# Patient Record
Sex: FEMALE | Race: WHITE | HISPANIC OR LATINO | Employment: UNEMPLOYED | URBAN - METROPOLITAN AREA
[De-identification: names, ages, dates, MRNs, and addresses within clinical notes are randomized per-mention and may not be internally consistent; named-entity substitution may affect disease eponyms.]

---
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
## 2020-09-01 ENCOUNTER — PHYSICAL THERAPY (OUTPATIENT)
Dept: PHYSICAL THERAPY | Facility: REHABILITATION | Age: 20
End: 2020-09-01
Attending: STUDENT IN AN ORGANIZED HEALTH CARE EDUCATION/TRAINING PROGRAM
Payer: COMMERCIAL

## 2020-09-01 DIAGNOSIS — G83.10 MONOPLEGIA OF LOWER EXTREMITY, UNSPECIFIED ETIOLOGY, UNSPECIFIED LATERALITY (HCC): ICD-10-CM

## 2020-09-01 PROCEDURE — 97163 PT EVAL HIGH COMPLEX 45 MIN: CPT

## 2020-09-01 ASSESSMENT — ENCOUNTER SYMPTOMS
PAIN SCALE: 0
PAIN TIMING: OCCASIONAL
PAIN SCALE AT LOWEST: 0

## 2020-09-01 ASSESSMENT — ACTIVITIES OF DAILY LIVING (ADL)
POOR_BALANCE: 1
AMBULATION_WITH_ASSISTIVE_DEVICE: INDEPENDENT
AMBULATION_WITHOUT_ASSISTIVE_DEVICE: SUPERVISION
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
## 2020-09-02 ENCOUNTER — TELEPHONE (OUTPATIENT)
Dept: PHYSICAL THERAPY | Facility: REHABILITATION | Age: 20
End: 2020-09-02

## 2020-09-15 ENCOUNTER — PHYSICAL THERAPY (OUTPATIENT)
Dept: PHYSICAL THERAPY | Facility: REHABILITATION | Age: 20
End: 2020-09-15
Attending: STUDENT IN AN ORGANIZED HEALTH CARE EDUCATION/TRAINING PROGRAM
Payer: COMMERCIAL

## 2020-09-15 DIAGNOSIS — M62.81 MUSCLE WEAKNESS (GENERALIZED): ICD-10-CM

## 2020-09-15 DIAGNOSIS — R26.9 UNSPECIFIED ABNORMALITIES OF GAIT AND MOBILITY: ICD-10-CM

## 2020-09-15 DIAGNOSIS — G83.10 MONOPLEGIA OF LOWER EXTREMITY, UNSPECIFIED ETIOLOGY, UNSPECIFIED LATERALITY (HCC): ICD-10-CM

## 2020-09-15 PROCEDURE — 97014 ELECTRIC STIMULATION THERAPY: CPT

## 2020-09-15 PROCEDURE — 97110 THERAPEUTIC EXERCISES: CPT

## 2020-09-15 PROCEDURE — 97112 NEUROMUSCULAR REEDUCATION: CPT

## 2020-09-15 NOTE — OP THERAPY DAILY TREATMENT
"  Outpatient Physical Therapy  DAILY TREATMENT     [facility] Physical [address]  [address]  [place] [address]  Phone:  [phone]  Fax:  [phone]    Date: 09/15/2020    Patient: Vanessa Cobos  YOB: 2000  MRN: [id number]     Time Calculation    Start time: 1430  Stop time: 1530 Time Calculation (min): 60 minutes         Chief Complaint: Difficulty Walking    Visit #: 2    SUBJECTIVE:  Pt reports she walks with walker without KAFO short distances at home.  LLE feels numb.  Wears KAFO at work, standing for 8 hour shift.    OBJECTIVE:        Therapeutic Exercises (CPT 04691):     1. Bridges, x10    2. STS, x10, requires cues to keep L foot equal with R rather than out in front of R foot.    4. Seated clams with med-light band, x10    5. Standing hip abd, x10L with BUEs    6. Standing hip flex/ext with foot slider, x10L with BUEs    7. Seated hamstring curl, unable    Therapeutic Treatments and Modalities:     1. Neuromuscular Re-education (CPT 59304)    2. E Stim Unattended (CPT 76656), Russian stim, 10sec on/10sec off, to L VMO., Pt demo intermittent twitching of LLE during EStim, but appeared to be involutary reaction to the sensation, not due to quad contraction.    Therapeutic Treatment and Modalities Summary: -AP weight shift in stride stance x10B in //bars with KAFO.  Manual facil at L knee for extension in stance.  -AP weight shift in stride stance x10 L foot forward, at FWW without KAFO.    Manual facil at L knee for extension in stance.  -Step tap to 2\" step x10L with BUEs.  -Supine LLE PNF with assist  -Supine L heel slides with assist x10  -Hooklying isometric L hip ER into manual resistance x10  -L quad sets into manual resistance x10        Time-based treatments/modalities:    Physical Therapy Timed Treatment Charges  Neuromusc re-ed, balance, coor, post minutes (CPT 57717): 30 minutes  Therapeutic exercise minutes (CPT 15047): 15 " minutes    ASSESSMENT:   Response to treatment: Pt demo poor motor control and functional use of LLE.    PLAN/RECOMMENDATIONS:   Plan for treatment: therapy treatment to continue next visit.  Planned interventions for next visit: continue with current treatment.  Standing, weight bearing, pre-gait work at //bars without KAFO.

## 2020-09-18 ENCOUNTER — PHYSICAL THERAPY (OUTPATIENT)
Dept: PHYSICAL THERAPY | Facility: REHABILITATION | Age: 20
End: 2020-09-18
Attending: STUDENT IN AN ORGANIZED HEALTH CARE EDUCATION/TRAINING PROGRAM
Payer: COMMERCIAL

## 2020-09-18 DIAGNOSIS — R26.9 UNSPECIFIED ABNORMALITIES OF GAIT AND MOBILITY: ICD-10-CM

## 2020-09-18 DIAGNOSIS — G83.10 MONOPLEGIA OF LOWER EXTREMITY, UNSPECIFIED ETIOLOGY, UNSPECIFIED LATERALITY (HCC): ICD-10-CM

## 2020-09-18 PROCEDURE — 97112 NEUROMUSCULAR REEDUCATION: CPT

## 2020-09-18 PROCEDURE — 97110 THERAPEUTIC EXERCISES: CPT

## 2020-09-18 NOTE — OP THERAPY DAILY TREATMENT
Outpatient Physical Therapy  DAILY TREATMENT     [facility] Physical [address]  [address]  [place] [address]  Phone:  [phone]  Fax:  [phone]    Date: 09/18/2020    Patient: Vanessa Cobos  YOB: 2000  MRN: [id number]     Time Calculation    Start time: 1500  Stop time: 1555 Time Calculation (min): 55 minutes         Chief Complaint: Difficulty Walking and Other (weakness)    Visit #: 3    SUBJECTIVE:  Felt ok after last PT session.  States legs are tired currently from standing all shift at work.    OBJECTIVE:        Therapeutic Exercises (CPT 45176):     1. Quad sets, L x10, minimal prox VM / adductor muscle activation visible    2. Hooklying hip abd/add, L, x10 with manual resistance to abd, AROM with tactile cues for add    3. Supine leg press, L, x10, AROM with Daija for hip/knee flex, maxA for hip/knee ext    4. Seated leg press into dynadisc, L, x10    5. NuStep, level 3, x5 min, emphasis on quad activation with L knee ext and not allowing leg to fall into ER in hip flexion    Therapeutic Treatments and Modalities:     1. Neuromuscular Re-education (CPT 47568)    Therapeutic Treatment and Modalities Summary: Standing without KAFO and BUEs at //bars with mirror for visual feedback.  Focus on midline orientation, getting hips and shoulders centered over feet, required visual and tactile cues.  Manual facilitation for LLE weight acceptance and quad and glute engagement for hip/knee extension.  With cueing, and Daija/CGA, pt able to decreased UE support at //bars in standing and maintaining L LE extension and midline orientation.  Able to lift both hands from //bars for only 1-2 seconds.  Lateral weight shift in standing with BUEs at //bars.  Initially with Daija to get adequate weight shift over LLE.  Then only with CGA.  Then with single UE support.  Observed less L knee ext as leg fatigued and with decreased UE support.  R step through at //bars with  manual facilitation at L quad and glute for L stance.  Pt initially relies heavily on UEs to lift body over LLE to allow for R swing.  Pt given instruction to lift less with UEs.  Pt able to minimize UE support, but still limited LLE weight acceptance.  Standing L hip flex with foot on floor slider, x10 with BUE support.  Standing L hip abd with foot on floor slider, x10 with BUE support.  STS from EOM with FWW in front of pt if needed.  Verbal and tactile cues to increased LLE weight bearing in STS.  Tactile cues at L knee and hip. Verbal cues to lean nose over L knee.  Pt given visual cue to look at to her left to avoid R lean.  Pt naturally places R foot back for STS, so PT foot placed behind pt's R foot so that she could not favor RLE.  Gait with KAFO and single Lofstrand crutch.  Pt demo fair L hip flexion in swing phase, L Trendelenburg, inadequate L hip extension in stance, L hip hike.      Time-based treatments/modalities:    Physical Therapy Timed Treatment Charges  Neuromusc re-ed, balance, coor, post minutes (CPT 99403): 30 minutes  Therapeutic exercise minutes (CPT 27180): 25 minutes    ASSESSMENT:   Response to treatment: Pt able to perform standing midline with decreased UE support with visual feedback and minimal tactile cues.    PLAN/RECOMMENDATIONS:   Plan for treatment: therapy treatment to continue next visit.  Planned interventions for next visit: continue with current treatment.

## 2020-09-22 ENCOUNTER — PHYSICAL THERAPY (OUTPATIENT)
Dept: PHYSICAL THERAPY | Facility: REHABILITATION | Age: 20
End: 2020-09-22
Attending: STUDENT IN AN ORGANIZED HEALTH CARE EDUCATION/TRAINING PROGRAM
Payer: COMMERCIAL

## 2020-09-22 DIAGNOSIS — G83.10 MONOPLEGIA OF LOWER EXTREMITY, UNSPECIFIED ETIOLOGY, UNSPECIFIED LATERALITY (HCC): ICD-10-CM

## 2020-09-22 DIAGNOSIS — R26.9 UNSPECIFIED ABNORMALITIES OF GAIT AND MOBILITY: ICD-10-CM

## 2020-09-22 DIAGNOSIS — M62.81 MUSCLE WEAKNESS (GENERALIZED): ICD-10-CM

## 2020-09-22 PROCEDURE — 97110 THERAPEUTIC EXERCISES: CPT

## 2020-09-22 PROCEDURE — 97112 NEUROMUSCULAR REEDUCATION: CPT

## 2020-09-22 NOTE — OP THERAPY DAILY TREATMENT
Outpatient Physical Therapy  DAILY TREATMENT     [facility] Physical [address]  [address]  [place] [address]  Phone:  [phone]  Fax:  [phone]    Date: 09/22/2020    Patient: Vanessa Cobos  YOB: 2000  MRN: [id number]     Time Calculation    Start time: 1456  Stop time: 1557 Time Calculation (min): 61 minutes         Chief Complaint: Difficulty Walking and Other (weakness)    Visit #: 4    SUBJECTIVE:  Feeling ok.    OBJECTIVE:        Therapeutic Exercises (CPT 70869):     1. Supine LLE ball roll HS curl, x10, Daija- palpable HS contraction    2. Supine hip abd/add slide, x10L    3. Bridges, x10, x10 with RLE over ball, PT CGA at L foot for weight acceptance    5. NuStep, level 3, x5 min    6. Standing hip rotation on Fitter spinner board, x15L    7. Standing hip abd/add, x10L , compensates with hip hike/trunk lean    Therapeutic Treatments and Modalities:     1. Neuromuscular Re-education (CPT 69230), all performed without KAFO except where noted.    Therapeutic Treatment and Modalities Summary: Seated leg press into ball, x15L with CGA at foot for placement.  Seated ball roll knee ext/flex with min A, x15L with manual facilitation for quad and HS recruitment.  STS to FWW x10 with manual facil at L glute and quad and cues for midline orientation.   Pt naturally places R foot back for STS, so PT foot placed behind pt's R foot so that she could not favor RLE.  STS with Airex under L foot to FWW, x8, with manual facilitation and cues for increased LLE weight acceptance.  Squats at walker - tactile cues for midline, x10  Standing without KAFO and BUEs at //bars with mirror for visual feedback.  Focus on midline orientation, getting hips and shoulders centered over feet, required visual and tactile cues.  Manual facilitation for LLE weight acceptance and quad and glute engagement for hip/knee extension.  With cueing, and Daija/CGA, pt able to decreased UE support  at //bars in standing and maintaining L LE extension and midline orientation.  Able to lift both hands from //bars for 3-4 seconds.  Lateral weight shift in standing with BUEs at //bars.  Initially with Daija to get adequate weight shift over LLE.  Then only with CGA.  Required continued cues for L hip ext with L weight shift.  AP weight shift in stride stance with BUE s at //bars, L foot forward.  Tactile cues and Daija for LLE weight acceptance and avoiding L knee hyperextension.  Poor forward weight translation into LLE, pt demo L pelvic retraction.  Given visual and tactile cues, pt able to improve pelvic translation and hip ext, still requiring manual facil and verbal cues.  Gait in //bars with ACE wrap to L ankle to limit inversion in weight bearing.  Cues for improved weight acceptance in L stance and avoiding knee hyperextension.  Step tap to 1/2 FR at //bars, alt R/L x10.  Pt instructed to slow down for L stance prior to lifting R foot.  Pt requires tactile cues and Daija to avoid L knee hyperextension in stance    Gait with KAFO and single Lofstrand crutch.  Pt given instruction to carry over pre-gait activities we had done in //bars (weight shift onto LLE in stance phase, with L hip in ext and L knee extended but not hyperextended, then allow for R heel off, complete weight shift to LLE, then R step through).  Pt demo L pelvic retraction and lack of hip extension and translation of COM over LLE.  Made pt slow down, and provided manual facilitation and tactile cues for improved L stance, with some improvement in gait.    Time-based treatments/modalities:    Physical Therapy Timed Treatment Charges  Neuromusc re-ed, balance, coor, post minutes (CPT 84372): 45 minutes  Therapeutic exercise minutes (CPT 12018): 15 minutes    ASSESSMENT:   Response to treatment: Pt demo improving una for interventions and improving equal LE weight bearing in standing.    PLAN/RECOMMENDATIONS:   Plan for treatment: therapy  treatment to continue next visit.  Planned interventions for next visit: continue with current treatment.

## 2020-09-25 ENCOUNTER — PHYSICAL THERAPY (OUTPATIENT)
Dept: PHYSICAL THERAPY | Facility: REHABILITATION | Age: 20
End: 2020-09-25
Attending: STUDENT IN AN ORGANIZED HEALTH CARE EDUCATION/TRAINING PROGRAM
Payer: COMMERCIAL

## 2020-09-25 DIAGNOSIS — M62.81 MUSCLE WEAKNESS (GENERALIZED): ICD-10-CM

## 2020-09-25 DIAGNOSIS — R26.9 UNSPECIFIED ABNORMALITIES OF GAIT AND MOBILITY: ICD-10-CM

## 2020-09-25 DIAGNOSIS — G83.10 MONOPLEGIA OF LOWER EXTREMITY, UNSPECIFIED ETIOLOGY, UNSPECIFIED LATERALITY (HCC): ICD-10-CM

## 2020-09-25 PROCEDURE — 97116 GAIT TRAINING THERAPY: CPT

## 2020-09-25 PROCEDURE — 97110 THERAPEUTIC EXERCISES: CPT

## 2020-09-25 NOTE — OP THERAPY DAILY TREATMENT
"  Outpatient Physical Therapy  DAILY TREATMENT     [facility] [address].  [address]  [place] [address]  Phone:  [phone]  Fax:  [phone]    Date: 09/25/2020    Patient: Vanessa Cobos  YOB: 2000  MRN: [id number]     Time Calculation    Start time: 1503  Stop time: 1600 Time Calculation (min): 57 minutes         Chief Complaint: Difficulty Walking and Other (weakness)    Visit #: 5    SUBJECTIVE:  Back and leg sore after last PT session.  States she feels like PT is helping.  She couldn't feel any muscle in LLE before, but now she feels the muscles are trying to \"turn on\".    OBJECTIVE:        Therapeutic Exercises (CPT 19603):     1. Supine LLE ball roll HS curl, x10, Daija    2. Quad set, 5 sec x10L    3. Bridges, x10, x10 with hip add squeeze, PT facilitate LLE weight acceptance at L knee and ankle    4. Hip add squeeze in supine legs extended, x10    5. LLE IR/ER log roll, x10L    6. Standing hip rotation on Fitter spinner board, x15L, Daija to control pelvic ROM    7. HS set LLE over ball in supine, x10    Therapeutic Treatments and Modalities:     1. Gait Training (CPT 80751), all performed without KAFO.    Therapeutic Treatment and Modalities Summary: -STS at //bars, hands on knees to press up to stand.  x10 with equal feet with PT tactile cueing/facil for L hip ext and at pelvis for equal weight to L.  Repeat STS x10 with L foot back, R foot forward.  -Gait in //bars with BUEs, mirror for visual feedback, with emphasis on midline orientation, appropriate weight shift to LLE for L stance without knee hyperextension.  -L SLS with R foot on 2\" step with BUE support at //bars.  Daija for hips/pelvis over LLE and cues for standing tall \"grow tall\" through leg.  -Squats at //bars- mirror for visual feedback and tactile cues for midline, x10.  -Standing with BUEs at //bars with mirror for visual feedback.  Focus on midline orientation, getting hips and " shoulders centered over feet, required min visual and tactile cues.  Manual facilitation for LLE weight acceptance and quad and glute engagement for hip/knee extension.  With cueing, pt able to decrease UE support at //bars in standing and maintaining L LE extension and midline orientation.  Able to lift both hands from //bars for 3 seconds at a time.  -AP weight shift in stride stance with BUE s at //bars, L foot forward.  Tactile cues and Daija for LLE weight acceptance and avoiding L knee hyperextension.  Poor forward weight translation into LLE, pt demo L pelvic retraction.  Given visual and tactile cues, pt able to improve pelvic translation and hip ext, still requiring manual facil and verbal cues.  Repeat AP weight shift with RUE support only. Max cues for knee flexion and heel off of trailing leg to encourage increased weight acceptance to LLE without overuse of RUE support.  -Step tap to 1/2 FR at //bars, alt R/L x10.  Pt instructed to slow down to prep L stance prior to lifting R foot.  Pt requires tactile cues and CGA to avoid L knee hyperextension in stance.  Cues to minimize UE use and increase weight shift L in L stance.    Time-based treatments/modalities:    Physical Therapy Timed Treatment Charges  Gait training minutes (CPT 04432): 39 minutes  Therapeutic exercise minutes (CPT 48648): 18 minutes  ASSESSMENT:   Response to treatment: Pt demo improving LLE functional use in weight bearing, improved self-correction of inadequate weight shifting and L knee hyperextension.    PLAN/RECOMMENDATIONS:   Plan for treatment: therapy treatment to continue next visit.  Planned interventions for next visit: continue with current treatment.

## 2020-09-29 ENCOUNTER — APPOINTMENT (OUTPATIENT)
Dept: PHYSICAL THERAPY | Facility: REHABILITATION | Age: 20
End: 2020-09-29
Attending: STUDENT IN AN ORGANIZED HEALTH CARE EDUCATION/TRAINING PROGRAM
Payer: COMMERCIAL

## 2021-08-06 ENCOUNTER — OFFICE VISIT (OUTPATIENT)
Dept: URGENT CARE | Facility: CLINIC | Age: 21
End: 2021-08-06
Payer: COMMERCIAL

## 2021-08-06 VITALS
WEIGHT: 140 LBS | BODY MASS INDEX: 27.48 KG/M2 | TEMPERATURE: 97.7 F | DIASTOLIC BLOOD PRESSURE: 68 MMHG | HEIGHT: 60 IN | HEART RATE: 84 BPM | SYSTOLIC BLOOD PRESSURE: 102 MMHG | OXYGEN SATURATION: 99 %

## 2021-08-06 DIAGNOSIS — S91.109A OPEN WOUND OF TOE, INITIAL ENCOUNTER: ICD-10-CM

## 2021-08-06 DIAGNOSIS — S81.802A WOUND OF LEFT LOWER EXTREMITY, INITIAL ENCOUNTER: ICD-10-CM

## 2021-08-06 PROCEDURE — 99204 OFFICE O/P NEW MOD 45 MIN: CPT | Performed by: NURSE PRACTITIONER

## 2021-08-06 RX ORDER — CEPHALEXIN 500 MG/1
500 CAPSULE ORAL 3 TIMES DAILY
Qty: 21 CAPSULE | Refills: 0 | Status: SHIPPED | OUTPATIENT
Start: 2021-08-06 | End: 2021-08-13

## 2021-08-06 ASSESSMENT — ENCOUNTER SYMPTOMS
FEVER: 0
CHILLS: 0

## 2021-08-06 NOTE — PROGRESS NOTES
Subjective:      Vanessa Cobos is a 21 y.o. female who presents with Nail Problem (toe nail big toe x2 days left ) and Rash (left leg x1 yr)      PMH: Monoplegia of the left lower extremity    Social History     Socioeconomic History   • Marital status: Single     Spouse name: Not on file   • Number of children: Not on file   • Years of education: Not on file   • Highest education level: Not on file   Occupational History   • Not on file   Tobacco Use   • Smoking status: Never Smoker   • Smokeless tobacco: Never Used   Vaping Use   • Vaping Use: Never used   Substance and Sexual Activity   • Alcohol use: No   • Drug use: No     Types: Marijuana   • Sexual activity: Never   Other Topics Concern   • Not on file   Social History Narrative   • Not on file     Social Determinants of Health     Financial Resource Strain:    • Difficulty of Paying Living Expenses:    Food Insecurity:    • Worried About Running Out of Food in the Last Year:    • Ran Out of Food in the Last Year:    Transportation Needs:    • Lack of Transportation (Medical):    • Lack of Transportation (Non-Medical):    Physical Activity:    • Days of Exercise per Week:    • Minutes of Exercise per Session:    Stress:    • Feeling of Stress :    Social Connections:    • Frequency of Communication with Friends and Family:    • Frequency of Social Gatherings with Friends and Family:    • Attends [employer] Services:    • Active Member of Clubs or Organizations:    • Attends Club or Organization Meetings:    • Marital Status:    Intimate Partner Violence:    • Fear of Current or Ex-Partner:    • Emotionally Abused:    • Physically Abused:    • Sexually Abused:      Family History   Problem Relation Age of Onset   • Diabetes Cousin        Allergies: Patient has no known allergies.    Patient is a 21-year-old female who presents today with complaint of wound to the dorsal aspect of the left big toe.  Also has had chronic dermatitis to the left anterior  leg.  Patient has a history of left lower extremity monoplegia.  She is in a leg brace.  States she has decreased sensation in her toes and lower extremity.    States she somehow got a cut to the dorsal aspect of the toe; she is uncertain of when this happened or how it happened.  States she has noted continued intermittent bleeding and some discharge from the toe.    Lastly, patient states over the last 3 years she has had chronic dermatitis/rash to the anterior left leg.  Has tried some creams on this previously from [place] but states the rash continues to return.        Other  The problem occurs constantly. The problem has been unchanged. Pertinent negatives include no chills or fever. Nothing aggravates the symptoms. She has tried nothing for the symptoms.       Review of Systems   Constitutional: Negative for chills and fever.   Skin:        Skin wound to the dorsal left toe and anterior left leg   All other systems reviewed and are negative.         Objective:     /68 (BP Location: Right arm, Patient Position: Sitting, BP Cuff Size: Adult)   Pulse 84   Temp 36.5 °C (97.7 °F) (Temporal)   Ht 1.524 m (5')   Wt 63.5 kg (140 lb) Comment: pt stated  LMP 07/31/2021 (Exact Date)   SpO2 99%   BMI 27.34 kg/m²      Physical Exam  Vitals reviewed.   Constitutional:       Appearance: Normal appearance.   Musculoskeletal:        Legs:         Feet:       Comments: Wound to the left great toe; scant amount of drainage; no surrounding erythema.    There is a scaly and somewhat ulcerated area to the anterior left leg.  No surrounding erythema or drainage   Neurological:      Mental Status: She is alert.                        Assessment/Plan:   Left leg wound  Open wound to the left toe    Dressing applied  Referral given to wound care clinic  Bactroban topically to both areas  Prescription for Keflex; start only for developing redness or erythema  Return to urgent care otherwise for any further questions or  concerns     There are no diagnoses linked to this encounter.

## 2021-08-19 ENCOUNTER — TELEPHONE (OUTPATIENT)
Dept: PHYSICAL THERAPY | Facility: REHABILITATION | Age: 21
End: 2021-08-19

## 2021-08-19 NOTE — OP THERAPY DISCHARGE SUMMARY
Outpatient Physical Therapy  DISCHARGE SUMMARY NOTE      [facility] [address].  [address]  [place] [address]  Phone:  [phone]  Fax:  [phone]    Date of Visit: 08/19/2021    Patient: Vanessa Cobos  YOB: 2000  MRN: [id number]     Referring Provider: No referring provider defined for this encounter.   Referring Diagnosis No admission diagnoses are documented for this encounter.         Functional Assessment Used        Your patient is being discharged from Physical Therapy with the following comments:   · Patient has failed to schedule or reschedule follow-up visits    Comments:  Pt not seen since 9/25/2020. New evaluation for same diagnosis in place. DC from this POC     Limitations Remaining:  NA    Recommendations:  JUSTICE Rangel, PT, DPT    Date: 8/19/2021

## 2021-08-20 ENCOUNTER — PHYSICAL THERAPY (OUTPATIENT)
Dept: PHYSICAL THERAPY | Facility: REHABILITATION | Age: 21
End: 2021-08-20
Attending: STUDENT IN AN ORGANIZED HEALTH CARE EDUCATION/TRAINING PROGRAM
Payer: COMMERCIAL

## 2021-08-20 DIAGNOSIS — G83.10 MONOPLEGIA OF LOWER EXTREMITY, UNSPECIFIED ETIOLOGY, UNSPECIFIED LATERALITY (HCC): ICD-10-CM

## 2021-08-20 PROCEDURE — 97163 PT EVAL HIGH COMPLEX 45 MIN: CPT

## 2021-08-20 ASSESSMENT — ACTIVITIES OF DAILY LIVING (ADL): POOR_BALANCE: 1
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
## 2021-09-08 ENCOUNTER — PHYSICAL THERAPY (OUTPATIENT)
Dept: PHYSICAL THERAPY | Facility: REHABILITATION | Age: 21
End: 2021-09-08
Attending: STUDENT IN AN ORGANIZED HEALTH CARE EDUCATION/TRAINING PROGRAM
Payer: COMMERCIAL

## 2021-09-08 DIAGNOSIS — G83.10 MONOPLEGIA OF LOWER EXTREMITY, UNSPECIFIED ETIOLOGY, UNSPECIFIED LATERALITY (HCC): ICD-10-CM

## 2021-09-08 DIAGNOSIS — M62.81 MUSCLE WEAKNESS (GENERALIZED): ICD-10-CM

## 2021-09-08 DIAGNOSIS — R26.9 UNSPECIFIED ABNORMALITIES OF GAIT AND MOBILITY: ICD-10-CM

## 2021-09-08 PROCEDURE — 97110 THERAPEUTIC EXERCISES: CPT

## 2021-09-08 PROCEDURE — 97112 NEUROMUSCULAR REEDUCATION: CPT

## 2021-09-08 NOTE — OP THERAPY DAILY TREATMENT
Outpatient Physical Therapy  DAILY TREATMENT     [facility] [address].  [address]  [place] [address]  Phone:  [phone]  Fax:  [phone]    Date: 09/08/2021    Patient: Vanessa Cobos  YOB: 2000  MRN: [id number]     Time Calculation    Start time: 1500  Stop time: 1543 Time Calculation (min): 43 minutes         Chief Complaint: Weakness, Loss Of Balance, and Difficulty Walking    Visit #: 2    SUBJECTIVE:  Pt reports she tried doing HEP for a few day, stopped for a week because she was sick. Last time she did exercises was 9/3 stating she forgot about them.    OBJECTIVE:            Therapeutic Exercises (CPT 97582):     1. Nustep, lvl 1 6 min, UE, cues drive left LE    2. Chops/lifts tall kneeling, 2 x 10 B, min a stabilty    3. Bridge split stance (Rt LE distal), 3 x 10, 5 sec    4. Split STS, 0    5. Tall kneeling holds no UE, 3 x 45 seconds, cues dec post trunk lean/excessive lordosis    6. Supine hip abduction, 20, cues dec ER and knee flexion    20. 8/20-11/5, 8Visits, 3 units TherEx 1 NMR      Therapeutic Exercise Summary: HEP:  bridge 3 x 1 min, supine abduction 3 x 10, hooklying sit up  9/8 added split stance bridge 30 reps, tall kneeling chops/lifts, tall kneeling holds    Therapeutic Treatments and Modalities:     1. Neuromuscular Re-education (CPT 84018)    Therapeutic Treatment and Modalities Summary: Tall kneeling  -medial/lateral weight shifts with therapy ball for UE support x 3 min  Mod tactile cues to facilitate left weight shift    Split tall kneeling: right LE positioned posteriorly to empathize Lt weight bearing. Therapy ball used for UE support, max tactile verbal cues to facilitate left weight shift    Half kneeling  Max A to stabilize Lt LE x 15 reps x 3-5 seconds. Therapy ball used for UE support.   Last attempt able to complete mod A x 8 seconds          Time-based treatments/modalities:    Physical Therapy Timed Treatment  Charges  Neuromusc re-ed, balance, coor, post minutes (CPT 02103): 17 minutes  Therapeutic exercise minutes (CPT 80169): 26 minutes        ASSESSMENT:   Pt tolerated intervention without pain. Required max A to stabilize in half kneeling due to Lt LE weakness, improved with reps. Cues throughout for Lt LE weight shift as pt has habitually over utilized Rt LE due to severe weakness. Continued to emphasize HEP compliance for max benefit.     PLAN/RECOMMENDATIONS:   Plan for treatment: therapy treatment to continue next visit.  Planned interventions for next visit: continue with current treatment.

## 2021-09-21 ENCOUNTER — APPOINTMENT (OUTPATIENT)
Dept: PHYSICAL THERAPY | Facility: REHABILITATION | Age: 21
End: 2021-09-21
Attending: STUDENT IN AN ORGANIZED HEALTH CARE EDUCATION/TRAINING PROGRAM
Payer: COMMERCIAL

## 2021-09-22 ENCOUNTER — PHYSICAL THERAPY (OUTPATIENT)
Dept: PHYSICAL THERAPY | Facility: REHABILITATION | Age: 21
End: 2021-09-22
Attending: STUDENT IN AN ORGANIZED HEALTH CARE EDUCATION/TRAINING PROGRAM
Payer: COMMERCIAL

## 2021-09-22 DIAGNOSIS — R26.9 UNSPECIFIED ABNORMALITIES OF GAIT AND MOBILITY: ICD-10-CM

## 2021-09-22 DIAGNOSIS — M62.81 MUSCLE WEAKNESS (GENERALIZED): ICD-10-CM

## 2021-09-22 DIAGNOSIS — G83.10 MONOPLEGIA OF LOWER EXTREMITY, UNSPECIFIED ETIOLOGY, UNSPECIFIED LATERALITY (HCC): ICD-10-CM

## 2021-09-22 PROCEDURE — 97112 NEUROMUSCULAR REEDUCATION: CPT

## 2021-09-22 PROCEDURE — 97110 THERAPEUTIC EXERCISES: CPT

## 2021-09-22 NOTE — OP THERAPY DAILY TREATMENT
Outpatient Physical Therapy  DAILY TREATMENT     [facility] [address]  [address]  [place] [address]  Phone:  [phone]  Fax:  [phone]    Date: 09/22/2021    Patient: Vanessa Cobos  YOB: 2000  MRN: [id number]     Time Calculation    Start time: 1416  Stop time: 1458 Time Calculation (min): 42 minutes         Chief Complaint: Weakness and Difficulty Walking    Visit #: 3    SUBJECTIVE:  Pt reports HEP compliance only with sidelying abduction and bridges, occasionally. Sometimes sit ups. Reports she usually does one exercise, sometimes, one per day. Thinks she completed sit ups Sunday that was last time she attempted HEP.    OBJECTIVE:            Therapeutic Exercises (CPT 48883):     1. Nustep, lvl 1 6 min, UE, cues drive left LE    2. Chops/lifts tall kneeling, 2 x 10 B, min a stabilty    3. Bridge split stance (Rt LE distal), 3 x 10, 5 sec    4. Split STS, 0    5. Ball assisted HS curls, x 15, supine, mod A    6. Supine hip abduction, 2 x 25 2#AW, cues dec ER and knee flexion    7. SL bridge, x 20 Lt, Lt LE    20. 8/20-11/5, 8Visits, 3 units TherEx 1 NMR      Therapeutic Exercise Summary: HEP:  bridge 3 x 1 min, supine abduction 3 x 10, hooklying sit up  9/8 added split stance bridge 30 reps, tall kneeling chops/lifts, tall kneeling holds    Therapeutic Treatments and Modalities:     1. Neuromuscular Re-education (CPT 62668)    Therapeutic Treatment and Modalities Summary: Tall kneeling  -medial/lateral weight shifts with therapy ball for UE support x 3 min  Mod tactile cues to facilitate left weight shift    Split tall kneeling: right LE positioned posteriorly to empathize Lt weight bearing. For reaching outside [place] and to facilitate weight shift pt completed balloon tap with CGA from PT. 4 x 1 min. Anterior LOB x 6 unable to correct resulting in pt using UE to catch herself on mat table, no injury.        Time-based  treatments/modalities:    Physical Therapy Timed Treatment Charges  Neuromusc re-ed, balance, coor, post minutes (CPT 77608): 12 minutes  Therapeutic exercise minutes (CPT 95495): 30 minutes        ASSESSMENT:   Significant time discussing poor HEP compliance and attendance in therapy as it has been almost 3 weeks since last visit. Pt lack of consistent completion of HEP will likely impact the POC. Discussed ways to improve compliance including dec amount of exercise or reps, setting timer on her phone, etc. Pt reported she will be be more compliant with HEP and verbalized understanding that if compliance and attendance don't improve she may be DC. Improved tolerance and stability with tall kneeling position.     PLAN/RECOMMENDATIONS:   Plan for treatment: therapy treatment to continue next visit.  Planned interventions for next visit: continue with current treatment.

## 2021-09-29 ENCOUNTER — PHYSICAL THERAPY (OUTPATIENT)
Dept: PHYSICAL THERAPY | Facility: REHABILITATION | Age: 21
End: 2021-09-29
Attending: STUDENT IN AN ORGANIZED HEALTH CARE EDUCATION/TRAINING PROGRAM
Payer: COMMERCIAL

## 2021-09-29 DIAGNOSIS — M62.81 MUSCLE WEAKNESS (GENERALIZED): ICD-10-CM

## 2021-09-29 DIAGNOSIS — G83.10 MONOPLEGIA OF LOWER EXTREMITY, UNSPECIFIED ETIOLOGY, UNSPECIFIED LATERALITY (HCC): ICD-10-CM

## 2021-09-29 DIAGNOSIS — R26.9 UNSPECIFIED ABNORMALITIES OF GAIT AND MOBILITY: ICD-10-CM

## 2021-09-29 PROCEDURE — 97110 THERAPEUTIC EXERCISES: CPT

## 2021-09-29 PROCEDURE — 97112 NEUROMUSCULAR REEDUCATION: CPT

## 2021-09-29 NOTE — OP THERAPY DAILY TREATMENT
Outpatient Physical Therapy  DAILY TREATMENT     [facility] [address].  [address]  [place] [address]  Phone:  [phone]  Fax:  [phone]    Date: 09/29/2021    Patient: Vanessa Cobos  YOB: 2000  MRN: [id number]     Time Calculation    Start time: 1415  Stop time: 1458 Time Calculation (min): 43 minutes         Chief Complaint: Weakness, Loss Of Balance, and Difficulty Walking    Visit #: 4    SUBJECTIVE:  Pt reports HEP compliance has improved. Thins hip abduction is getting easy, others are still difficult. Reports she has noticed her leg getting stronger.  Has new attachment on KAFO but this has caused and open wound covered by ACE wrap. Pt reports prosthetist making new attachment and told her to stop using current one, but she is still using.    OBJECTIVE:            Therapeutic Exercises (CPT 20710):     1. Nustep, lvl 1 5 min, UE, cues drive left LE    2. Chops/lifts tall kneeling, 2 x 10 B, min a stabilty    3. Bridge split stance (Rt LE distal), 3 x 15, 5 sec    4. Split STS, 6, max A lowering, sig UE    5. Ball assisted HS curls, 0, supine, mod A    6. Supine hip abduction isoemtric, 5 x 30 sec  white TB @ mid calf, cues dec ER and knee flexion    7. SL bridge, x 30 Lt, Lt LE    20. 8/20-11/5, 8Visits, 3 units TherEx 1 NMR      Therapeutic Exercise Summary: HEP:  bridge 3 x 1 min, supine abduction 3 x 10, hooklying sit up  9/8 added split stance bridge 30 reps, tall kneeling chops/lifts, tall kneeling holds    Therapeutic Treatments and Modalities:     1. Neuromuscular Re-education (CPT 68019)    Therapeutic Treatment and Modalities Summary: Quadruped alt UE lifts with 3-8 sec holds. Increased difficulty lifting Rt UE    Tall kneeling  -medial/lateral weight shifts with therapy ball for UE support x 4 min  Mod tactile cues to facilitate left weight shift  1/2 kneeling. Pt able to maintain x 1 min with mod/max UE support on therapy ball.  Without pt almost immediately collapses. Occasional cues to dec hip drop with tactile and verbal cues.             Time-based treatments/modalities:    Physical Therapy Timed Treatment Charges  Neuromusc re-ed, balance, coor, post minutes (CPT 97370): 13 minutes  Therapeutic exercise minutes (CPT 44499): 30 minutes        ASSESSMENT:   Educated pt on importance of discontinuing new attachment due to wound and educated on risk for further break down and infection. Highly encouraged pt to remove leather knee attachment from KAFO while in facility, pt opted to remove at home.   Continues to have severe left LE deficits but some observable improvement with progression of SL bridges, although clearance is very minimal. Tolerated progressions fair. Unable to perform split stand without rapid descent into sitting position almost immediately and with max UE use.     PLAN/RECOMMENDATIONS:   Plan for treatment: therapy treatment to continue next visit.  Planned interventions for next visit: continue with current treatment.

## 2021-10-04 NOTE — OP THERAPY DAILY TREATMENT
Outpatient Physical Therapy  DAILY TREATMENT     [facility] [address]  [address]  [place] [address]  Phone:  [phone]  Fax:  [phone]    Date: 10/05/2021    Patient: Vanessa Cobos  YOB: 2000  MRN: [id number]     Time Calculation                   Chief Complaint: No chief complaint on file.    Visit #: 5    SUBJECTIVE:  Pt reports HEP compliance has improved. Thins hip abduction is getting easy, others are still difficult. Reports she has noticed her leg getting stronger.  Has new attachment on KAFO but this has caused and open wound covered by ACE wrap. Pt reports prosthetist making new attachment and told her to stop using current one, but she is still using.    OBJECTIVE:            Therapeutic Exercises (CPT 94494):     1. Nustep, lvl 1 5 min, UE, cues drive left LE    2. Chops/lifts tall kneeling, 2 x 10 B, min a stabilty    3. Bridge split stance (Rt LE distal), 3 x 15, 5 sec    4. Split STS, 6, max A lowering, sig UE    5. Ball assisted HS curls, 0, supine, mod A    6. Supine hip abduction isoemtric, 5 x 30 sec  white TB @ mid calf, cues dec ER and knee flexion    7. SL bridge, x 30 Lt, Lt LE    9. PN?    20. 8/20-11/5, 8Visits, 3 units TherEx 1 NMR      Therapeutic Exercise Summary: HEP:  bridge 3 x 1 min, supine abduction 3 x 10, hooklying sit up  9/8 added split stance bridge 30 reps, tall kneeling chops/lifts, tall kneeling holds    Therapeutic Treatments and Modalities:     1. Neuromuscular Re-education (CPT 62698)    Therapeutic Treatment and Modalities Summary: Quadruped alt UE lifts with 3-8 sec holds. Increased difficulty lifting Rt UE    Tall kneeling  -medial/lateral weight shifts with therapy ball for UE support x 4 min  Mod tactile cues to facilitate left weight shift  1/2 kneeling. Pt able to maintain x 1 min with mod/max UE support on therapy ball. Without pt almost immediately collapses. Occasional cues to dec hip drop with  tactile and verbal cues.             Time-based treatments/modalities:             ASSESSMENT:   Educated pt on importance of discontinuing new attachment due to wound and educated on risk for further break down and infection. Highly encouraged pt to remove leather knee attachment from KAFO while in facility, pt opted to remove at home.   Continues to have severe left LE deficits but some observable improvement with progression of SL bridges, although clearance is very minimal. Tolerated progressions fair. Unable to perform split stand without rapid descent into sitting position almost immediately and with max UE use.     PLAN/RECOMMENDATIONS:   Plan for treatment: therapy treatment to continue next visit.  Planned interventions for next visit: continue with current treatment.

## 2021-10-05 ENCOUNTER — APPOINTMENT (OUTPATIENT)
Dept: PHYSICAL THERAPY | Facility: REHABILITATION | Age: 21
End: 2021-10-05
Attending: STUDENT IN AN ORGANIZED HEALTH CARE EDUCATION/TRAINING PROGRAM
Payer: COMMERCIAL

## 2021-10-07 ENCOUNTER — APPOINTMENT (OUTPATIENT)
Dept: URGENT CARE | Facility: CLINIC | Age: 21
End: 2021-10-07
Payer: COMMERCIAL

## 2021-10-07 ENCOUNTER — PHYSICAL THERAPY (OUTPATIENT)
Dept: PHYSICAL THERAPY | Facility: REHABILITATION | Age: 21
End: 2021-10-07
Attending: STUDENT IN AN ORGANIZED HEALTH CARE EDUCATION/TRAINING PROGRAM
Payer: COMMERCIAL

## 2021-10-07 DIAGNOSIS — M62.81 MUSCLE WEAKNESS (GENERALIZED): ICD-10-CM

## 2021-10-07 DIAGNOSIS — R26.9 UNSPECIFIED ABNORMALITIES OF GAIT AND MOBILITY: ICD-10-CM

## 2021-10-07 DIAGNOSIS — G83.10 MONOPLEGIA OF LOWER EXTREMITY, UNSPECIFIED ETIOLOGY, UNSPECIFIED LATERALITY (HCC): ICD-10-CM

## 2021-10-07 PROCEDURE — 97110 THERAPEUTIC EXERCISES: CPT

## 2021-10-07 NOTE — OP THERAPY DAILY TREATMENT
Outpatient Physical Therapy  DAILY TREATMENT     [facility] [address].  [address]  [place] [address]  Phone:  [phone]  Fax:  [phone]    Date: 10/07/2021    Patient: Vanessa Cobos  YOB: 2000  MRN: [id number]     Time Calculation    Start time: 1400  Stop time: 1430 Time Calculation (min): 30 minutes         Chief Complaint: Difficulty Walking, Loss Of Balance, and Weakness    Visit #: 5    SUBJECTIVE:  Pt reports she still has open left knee wound and did not remove piece causing wound/irritation from KAFO as instructed last session, last week.     OBJECTIVE:  Left knee wound at grade 2, mod redness and warmth with clear exudate seeping from wound. Distally pt has what appeared to be a callus/hardening with skin appearing white.          Therapeutic Exercises (CPT 41622):     1. Nustep, lvl 1 6 min, UE, cues drive left LE    2. Chops/lifts tall kneeling, 0, min a stabilty    3. Bridge split stance (Rt LE distal), 3 x 15, 5 sec    4. Split STS, 8, max A lowering, sig UE, max A left knee to dec flexion and facilitate weight shift    5. Ball assisted HS curls, 0, supine, mod A    6. Supine hip abduction isoemtric, 5 x 30 sec  white TB @ mid calf, cues dec ER and knee flexion    7. SL bridge, Lt LE    20. 8/20-11/5, 8Visits, 3 units TherEx 1 NMR      Therapeutic Exercise Summary: HEP:  bridge 3 x 1 min, supine abduction 3 x 10, hooklying sit up  9/8 added split stance bridge 30 reps, tall kneeling chops/lifts, tall kneeling holds    Therapeutic Treatments and Modalities:     Therapeutic Treatment and Modalities Summary:           Time-based treatments/modalities:    Physical Therapy Timed Treatment Charges  Therapeutic exercise minutes (CPT 40359): 30 minutes        ASSESSMENT:   Part way though session pt reported left knee wound still open, worsening, and weeping through her pants and ace bandage. Donned gloves to assess. When pt removed bandage  large open wound noted over left patella/patellar tendon. Wound appeared to be grade two, mod redness and heat and clear fluid seeping from wound. Session ended and pt instructed to seek out urgent care for possible infection. Therapist physically removed anterior attachment from KAFO that is causing the irritation and handed to pt. Educated pt on importance of wound care and treatment for prevention of infection especially as pt does not have any sesation in this area. Pt agreed and reported she would seek urgent care now. .     PLAN/RECOMMENDATIONS:   Plan for treatment: therapy treatment to continue next visit.  Planned interventions for next visit: continue with current treatment.

## 2021-10-08 ENCOUNTER — OFFICE VISIT (OUTPATIENT)
Dept: URGENT CARE | Facility: CLINIC | Age: 21
End: 2021-10-08
Payer: COMMERCIAL

## 2021-10-08 VITALS
RESPIRATION RATE: 16 BRPM | WEIGHT: 156.2 LBS | HEART RATE: 70 BPM | TEMPERATURE: 96.7 F | BODY MASS INDEX: 30.67 KG/M2 | OXYGEN SATURATION: 98 % | DIASTOLIC BLOOD PRESSURE: 78 MMHG | SYSTOLIC BLOOD PRESSURE: 102 MMHG | HEIGHT: 60 IN

## 2021-10-08 DIAGNOSIS — G83.10 MONOPLEGIA OF LOWER EXTREMITY, UNSPECIFIED ETIOLOGY, UNSPECIFIED LATERALITY (HCC): ICD-10-CM

## 2021-10-08 DIAGNOSIS — L89.899 PRESSURE ULCER OF LEFT LEG: ICD-10-CM

## 2021-10-08 DIAGNOSIS — M79.89 LEG SWELLING: ICD-10-CM

## 2021-10-08 PROBLEM — G95.9 CERVICAL MYELOPATHY (HCC): Status: ACTIVE | Noted: 2019-10-23

## 2021-10-08 PROBLEM — S06.9X9A UNSPECIFIED INTRACRANIAL INJURY WITH LOSS OF CONSCIOUSNESS OF UNSPECIFIED DURATION, INITIAL ENCOUNTER (HCC): Status: ACTIVE | Noted: 2019-03-04

## 2021-10-08 PROBLEM — M23.51 CHRONIC INSTABILITY OF KNEE, RIGHT KNEE: Status: ACTIVE | Noted: 2019-10-23
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
## 2021-10-08 PROCEDURE — 99214 OFFICE O/P EST MOD 30 MIN: CPT | Performed by: NURSE PRACTITIONER

## 2021-10-08 RX ORDER — DULOXETIN HYDROCHLORIDE 60 MG/1
CAPSULE, DELAYED RELEASE ORAL
COMMUNITY
Start: 2021-04-28 | End: 2022-07-13

## 2021-10-09 ENCOUNTER — HOSPITAL ENCOUNTER (EMERGENCY)
Facility: MEDICAL CENTER | Age: 21
End: 2021-10-09
Attending: EMERGENCY MEDICINE
Payer: COMMERCIAL

## 2021-10-09 VITALS
DIASTOLIC BLOOD PRESSURE: 79 MMHG | SYSTOLIC BLOOD PRESSURE: 138 MMHG | HEIGHT: 60 IN | HEART RATE: 80 BPM | RESPIRATION RATE: 18 BRPM | TEMPERATURE: 97 F | OXYGEN SATURATION: 100 % | BODY MASS INDEX: 30.3 KG/M2 | WEIGHT: 154.32 LBS

## 2021-10-09 DIAGNOSIS — S80.812A INFECTED ABRASION OF LEFT LOWER EXTREMITY, INITIAL ENCOUNTER: ICD-10-CM

## 2021-10-09 DIAGNOSIS — L08.9 INFECTED ABRASION OF LEFT LOWER EXTREMITY, INITIAL ENCOUNTER: ICD-10-CM

## 2021-10-09 PROCEDURE — 700102 HCHG RX REV CODE 250 W/ 637 OVERRIDE(OP): Performed by: EMERGENCY MEDICINE

## 2021-10-09 PROCEDURE — A9270 NON-COVERED ITEM OR SERVICE: HCPCS | Performed by: EMERGENCY MEDICINE

## 2021-10-09 PROCEDURE — 304217 HCHG IRRIGATION SYSTEM

## 2021-10-09 PROCEDURE — 99283 EMERGENCY DEPT VISIT LOW MDM: CPT

## 2021-10-09 RX ORDER — DOXYCYCLINE 100 MG/1
100 TABLET ORAL ONCE
Status: COMPLETED | OUTPATIENT
Start: 2021-10-09 | End: 2021-10-09

## 2021-10-09 RX ORDER — DOXYCYCLINE 100 MG/1
100 CAPSULE ORAL 2 TIMES DAILY
Qty: 20 CAPSULE | Refills: 0 | Status: SHIPPED | OUTPATIENT
Start: 2021-10-09 | End: 2021-10-19

## 2021-10-09 RX ADMIN — DOXYCYCLINE 100 MG: 100 TABLET, FILM COATED ORAL at 13:45
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
## 2021-10-09 NOTE — Clinical Note
Vanessa Cobos was seen and treated in our emergency department on 10/9/2021.  She may return to work on 10/13/2021.       If you have any questions or concerns, please don't hesitate to call.      Marcel Moreno M.D.
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
## 2021-10-09 NOTE — DISCHARGE INSTRUCTIONS
Please take doxycycline for 10 days as directed  Please avoid using your brace for at least the next 2 to 3 days to allow the area to heal  Please see your doctor on Tuesday or Wednesday of this coming week

## 2021-10-12 ENCOUNTER — APPOINTMENT (OUTPATIENT)
Dept: PHYSICAL THERAPY | Facility: REHABILITATION | Age: 21
End: 2021-10-12
Attending: STUDENT IN AN ORGANIZED HEALTH CARE EDUCATION/TRAINING PROGRAM
Payer: COMMERCIAL

## 2021-10-12 NOTE — OP THERAPY DAILY TREATMENT
Outpatient Physical Therapy  DAILY TREATMENT     [facility] Physical Therapy [address].  [address]  [place] [address]  Phone:  [phone]  Fax:  [phone]    Date: 10/12/2021    Patient: Vanessa Cobos  YOB: 2000  MRN: [id number]     Time Calculation                   Chief Complaint: No chief complaint on file.    Visit #: 6    SUBJECTIVE:  Pt reports she still has open left knee wound and did not remove piece causing wound/irritation from KAFO as instructed last session, last week.     OBJECTIVE:  Left knee wound at grade 2, mod redness and warmth with clear exudate seeping from wound. Distally pt has what appeared to be a callus/hardening with skin appearing white.          Therapeutic Exercises (CPT 47652):     1. Nustep, lvl 1 6 min, UE, cues drive left LE    2. Chops/lifts tall kneeling, 0, min a stabilty    3. Bridge split stance (Rt LE distal), 3 x 15, 5 sec    4. Split STS, 8, max A lowering, sig UE, max A left knee to dec flexion and facilitate weight shift    5. Ball assisted HS curls, 0, supine, mod A    6. Supine hip abduction isoemtric, 5 x 30 sec  white TB @ mid calf, cues dec ER and knee flexion    7. SL bridge, Lt LE    20. 8/20-11/5, 8Visits, 3 units TherEx 1 NMR      Therapeutic Exercise Summary: HEP:  bridge 3 x 1 min, supine abduction 3 x 10, hooklying sit up  9/8 added split stance bridge 30 reps, tall kneeling chops/lifts, tall kneeling holds    Therapeutic Treatments and Modalities:     Therapeutic Treatment and Modalities Summary:           Time-based treatments/modalities:             ASSESSMENT:   Pt was seen by urgent care then referred to ED due to cellulitis in left anterior knee wound on 10/8.  PLAN/RECOMMENDATIONS:   Plan for treatment: therapy treatment to continue next visit.  Planned interventions for next visit: continue with current treatment.

## 2021-10-13 NOTE — OP THERAPY DAILY TREATMENT
Outpatient Physical Therapy  DAILY TREATMENT     [facility] Physical [address].  [address]  [place] [address]  Phone:  [phone]  Fax:  [phone]    Date: 10/14/2021    Patient: Vanessa Cobos  YOB: 2000  MRN: [id number]     Time Calculation    Start time: 1400  Stop time: 1444 Time Calculation (min): 44 minutes         Chief Complaint: Difficulty Walking, Weakness, and Loss Of Balance    Visit #: 6    SUBJECTIVE:  Reports feeling stronger. Has not been taking antibiotics provided by ER for fear of side effects listed on bottle.     OBJECTIVE:  Left knee wound at grade 2, mod redness and warmth with clear exudate seeping from wound. Distally pt has what appeared to be a callus/hardening with skin appearing white.  6MWT: 906 feet  5STS 21 sec BUE  MCTSIB: 90/120  cond 4 0      Therapeutic Exercises (CPT 86574):     1. Nustep, lvl 1 8 min, UE, cues drive left LE    2. Chops/lifts tall kneeling, 0, min a stabilty    3. Bridge split stance (Rt LE distal), 0    4. Split STS, 8, max A lowering, sig UE, max A left knee to dec flexion and facilitate weight shift    5. Ball assisted HS curls, 0, supine, mod A    6. Supine hip abduction isoemtric, 0, cues dec ER and knee flexion    7. SL bridge, 2 x 20, Lt LE    8. 6MWT    9. 5STS    20. 8/20-11/5, 8Visits, 3 units TherEx 1 NMR      Therapeutic Exercise Summary: HEP:  bridge 3 x 1 min, supine abduction 3 x 10, hooklying sit up  9/8 added split stance bridge 30 reps, tall kneeling chops/lifts, tall kneeling holds    Therapeutic Treatments and Modalities:     1. Neuromuscular Re-education (CPT 68688)    Therapeutic Treatment and Modalities Summary: AIrex x 1 min ea  Normal NAHUN  Normal NAHUN vert head turns  Normal NAHUN horiz head turns  Normal NAHUN EC frequent UE supp)    Narrow NAHUN  Narrow NAHUN vert head turns  Narrow NAHUN horiz head turns  Narrow NAHUN EC          Time-based treatments/modalities:    Physical Therapy Timed  Treatment Charges  Neuromusc re-ed, balance, coor, post minutes (CPT 79146): 10 minutes  Therapeutic exercise minutes (CPT 14848): 34 minutes        ASSESSMENT:   Encouraged compliance with medication as prescribed by physician and to contact of concerned with side effects. Educated on impacts of untreated infection. Discussed POC Brief reassessment id not showing objective improvements however py subjectively feels stronger. Will reassess next week.    PLAN/RECOMMENDATIONS:   Plan for treatment: therapy treatment to continue next visit.  Planned interventions for next visit: continue with current treatment.

## 2021-10-14 ENCOUNTER — PHYSICAL THERAPY (OUTPATIENT)
Dept: PHYSICAL THERAPY | Facility: REHABILITATION | Age: 21
End: 2021-10-14
Attending: STUDENT IN AN ORGANIZED HEALTH CARE EDUCATION/TRAINING PROGRAM
Payer: COMMERCIAL

## 2021-10-14 DIAGNOSIS — G83.10 MONOPLEGIA OF LOWER EXTREMITY, UNSPECIFIED ETIOLOGY, UNSPECIFIED LATERALITY (HCC): ICD-10-CM

## 2021-10-14 PROCEDURE — 97112 NEUROMUSCULAR REEDUCATION: CPT

## 2021-10-14 PROCEDURE — 97110 THERAPEUTIC EXERCISES: CPT

## 2021-10-19 ENCOUNTER — PHYSICAL THERAPY (OUTPATIENT)
Dept: PHYSICAL THERAPY | Facility: REHABILITATION | Age: 21
End: 2021-10-19
Attending: STUDENT IN AN ORGANIZED HEALTH CARE EDUCATION/TRAINING PROGRAM
Payer: COMMERCIAL

## 2021-10-19 DIAGNOSIS — M62.81 MUSCLE WEAKNESS (GENERALIZED): ICD-10-CM

## 2021-10-19 DIAGNOSIS — R26.9 UNSPECIFIED ABNORMALITIES OF GAIT AND MOBILITY: ICD-10-CM

## 2021-10-19 DIAGNOSIS — G83.10 MONOPLEGIA OF LOWER EXTREMITY, UNSPECIFIED ETIOLOGY, UNSPECIFIED LATERALITY (HCC): ICD-10-CM

## 2021-10-19 PROCEDURE — 97110 THERAPEUTIC EXERCISES: CPT

## 2021-10-19 PROCEDURE — 97112 NEUROMUSCULAR REEDUCATION: CPT

## 2021-10-19 NOTE — OP THERAPY DAILY TREATMENT
Outpatient Physical Therapy  DAILY TREATMENT     [facility] [address].  [address]  [place] [address]  Phone:  [phone]  Fax:  [phone]    Date: 10/19/2021    Patient: Vanessa Cobos  YOB: 2000  MRN: [id number]     Time Calculation    Start time: 1400  Stop time: 1442 Time Calculation (min): 42 minutes         Chief Complaint: Weakness, Loss Of Balance, and Difficulty Walking    Visit #: 7    SUBJECTIVE:  Reports wound healing well and taking antibiotics as prescribed. Understands next visit to be DC    OBJECTIVE:  Left knee wound at grade 2, mod redness and warmth with clear exudate seeping from wound. Distally pt has what appeared to be a callus/hardening with skin appearing white.  6MWT: 906 feet  5STS 21 sec BUE  MCTSIB: 90/120  cond 4 0      Therapeutic Exercises (CPT 30375):     1. Nustep, lvl 1 8 min, UE, cues drive left LE    2. Chops/lifts tall kneeling, 0, min a stabilty    3. Bridge split stance (Rt LE distal), 0    4. Split STS, 8, max A lowering, sig UE, max A left knee to dec flexion and facilitate weight shift    5. Ball assisted HS curls, 0, supine, mod A    6. Supine hip abduction isoemtric, 0, cues dec ER and knee flexion    7. SL bridge, 0, Lt LE    9. Tall kneeling trunk rotation, 30 B, cues for weight shift, BTB UE flex 90 deg    10. Half kneeling perturbations, 3 x 1 min, Lt half kneel, UE on ball, perturbations to ball to force w/s    20. 8/20-11/5, 8Visits, 3 units TherEx 1 NMR      Therapeutic Exercise Summary: HEP:  bridge 3 x 1 min, supine abduction 3 x 10, hooklying sit up  9/8 added split stance bridge 30 reps, tall kneeling chops/lifts, tall kneeling holds    Therapeutic Treatments and Modalities:     1. Neuromuscular Re-education (CPT 67109)    Therapeutic Treatment and Modalities Summary: Tall kneeling w/s M/L x 1 min  heel sits therapy ball assist UE 30 mod cues for left LE use.  Tall kneeling shoulder pull downs for  perturbations and stabilty. x30  Half kneeling Lt LE x 3 min fluctuating UE support as needed. Unable to perform more than 1-2 second     Time-based treatments/modalities:    Physical Therapy Timed Treatment Charges  Neuromusc re-ed, balance, coor, post minutes (CPT 98307): 18 minutes  Therapeutic exercise minutes (CPT 29026): 24 minutes        ASSESSMENT:   Continued to educate on importance of HEP compliance after DC next visit for continued strength improvements. Due to chronic nature of condition and limited HEP compliance likely contributing to min improvements seen last visit. Will assess and likely DC next visit.     PLAN/RECOMMENDATIONS:   Plan for treatment: therapy treatment to continue next visit.  Planned interventions for next visit: continue with current treatment.

## 2021-10-28 ENCOUNTER — PHYSICAL THERAPY (OUTPATIENT)
Dept: PHYSICAL THERAPY | Facility: REHABILITATION | Age: 21
End: 2021-10-28
Attending: STUDENT IN AN ORGANIZED HEALTH CARE EDUCATION/TRAINING PROGRAM
Payer: COMMERCIAL

## 2021-10-28 DIAGNOSIS — G83.10 MONOPLEGIA OF LOWER EXTREMITY, UNSPECIFIED ETIOLOGY, UNSPECIFIED LATERALITY (HCC): ICD-10-CM

## 2021-10-28 DIAGNOSIS — M62.81 MUSCLE WEAKNESS (GENERALIZED): ICD-10-CM

## 2021-10-28 PROCEDURE — 97110 THERAPEUTIC EXERCISES: CPT

## 2021-10-28 NOTE — OP THERAPY DISCHARGE SUMMARY
Outpatient Physical Therapy  DISCHARGE SUMMARY NOTE      [place] [address]  [address]  [facility] [address]  Phone:  [phone]  Fax:  [phone]    Date of Visit: 10/28/2021    Patient: Vanessa Cobos  YOB: 2000  MRN: [id number]     Referring Provider: Kait Salinas M.D.  [address] [place] [address]   Referring Diagnosis Monoplegia of lower limb affecting unspecified side [G83.10]         Functional Assessment Used        Your patient is being discharged from Physical Therapy with the following comments:   · Goals not met    Comments:  At this time pt appropriate to DC due to min/no progress towards goals. Although her attendance during POC was good, with only 1 cancel, her significant inconsistency with HEP as well as chronic nature of condition significantly impacted POC. However, this does not mean she kulwant not have chance a improvement in future. Heavily, encouraged HEP compliance for 3-4 months and if pt notices strength improvement to potentially return for PT intervention. Pt verbalized understanding and in agreement for DC.    Educated on skin protection related to KAFO. Pt unable to immediately recall education 10 min later. Provided written hand out to assess skin frequently after doffing brace for nonblanchable skin.       Short Term Goals:   1. Pt will be compliant with HEP 3-5x per week for improving strength and stability. Partially met, pt compliance has fluctuated throughout POC.  2. Pt will complete most appropriate balance assessment. Met  Short term goal time span:  4-6 weeks      Long Term Goals:    1. Pt will demonstrate subjectively report on LEFS ability to ascend/descend 10 stairs as little bit of difficulty. MET  2. Pt will demonstrate improved functional LE strength with 5STS BUE 10 seconds or less. Not Met  3. Pt will demonstrate improved CV endurance with 6MWT score 1200 feet or more. Not met  4. Pt will  ambulate 300 feet on uneven surfaces with KAFO and Rt [place] crutch mod I to dec fall risk in the community. Not met  5. Pt will score low risk fall risk on most appropriate outcome measure. Not met  Long term goal time span:  2-4 months    Jus Rangel, PT, DPT    Date: 10/28/2021

## 2021-10-28 NOTE — OP THERAPY DAILY TREATMENT
"  Outpatient Physical Therapy  DAILY TREATMENT     [facility] Physical [address].  [address]  [place] [address]  Phone:  [phone]  Fax:  [phone]    Date: 10/28/2021    Patient: Vanessa Cobos  YOB: 2000  MRN: [id number]     Time Calculation    Start time: 1400  Stop time: 1441 Time Calculation (min): 41 minutes         Chief Complaint: Difficulty Walking, Loss Of Balance, and Weakness    Visit #: 8    SUBJECTIVE:  Agrees to DC today due to min/ no progress. Admits she has been doing HEP \"some times\" and some of the exercises.   Received new KAFO last week.     OBJECTIVE:.  6MWT: 916 feet  5STS 27 sec BUE  MCTSIB: 90/120  cond 4 0      Therapeutic Exercises (CPT 14250):     1. Nustep, lvl 1 8 min, UE, cues drive left LE    2. Chops/lifts tall kneeling, 20B 3# DB, min a stabilty    3. Bridge split stance (Rt LE distal), 3 x 15    4. Split STS, 8, max A lowering, sig UE, max A left knee to dec flexion and facilitate weight shift    5. Ball assisted HS curls, 0, supine, mod A    6. Supine hip abduction isoemtric, 0, cues dec ER and knee flexion    7. SL bridge, 0, Lt LE    9. Tall kneeling trunk rotation, 30 B, cues for weight shift, BTB UE flex 90 deg    10. Half kneeling perturbations, 3 x 1 min, Lt half kneel, UE on ball, perturbations to ball to force w/s    11. Reassessment    20. 8/20-11/5, 8Visits, 3 units TherEx 1 NMR      Therapeutic Exercise Summary: HEP:  bridge 3 x 1 min, supine abduction 3 x 10, hooklying sit up  9/8 added split stance bridge 30 reps, tall kneeling chops/lifts, tall kneeling holds    Therapeutic Treatments and Modalities:     1. Neuromuscular Re-education (CPT 11642)    Therapeutic Treatment and Modalities Summary: Tall kneeling w/s M/L x 1 min  heel sits therapy ball assist UE 30 mod cues for left LE use.  Tall kneeling shoulder pull downs for perturbations and stabilty. x30  Half kneeling Lt LE x 3 min fluctuating UE support as " needed. Unable to perform more than 1-2 second     Time-based treatments/modalities:    Physical Therapy Timed Treatment Charges  Therapeutic exercise minutes (CPT 71462): 41 minutes        ASSESSMENT:   At this time pt appropriate to DC due to min/no progress towards goals. Although her attendance during POC was good, with only 1 cancel, her significant inconsistency with HEP as well as chronic nature of condition significantly impacted POC. However, this does not mean she kulwant not have chance a improvement in future. Heavily, encouraged HEP compliance for 3-4 months and if pt notices strength improvement to potentially return for PT intervention. Pt verbalized understanding and in agreement for DC.    Educated on skin protection related to KAFO. Pt unable to immediately recall education 10 min later. Provided written hand out to assess skin frequently after doffing brace for nonblanchable skin.       Short Term Goals:   1. Pt will be compliant with HEP 3-5x per week for improving strength and stability. Partially met, pt compliance has fluctuated throughout POC.  2. Pt will complete most appropriate balance assessment. Met  Short term goal time span:  4-6 weeks      Long Term Goals:    1. Pt will demonstrate subjectively report on LEFS ability to ascend/descend 10 stairs as little bit of difficulty. MET  2. Pt will demonstrate improved functional LE strength with 5STS BUE 10 seconds or less. Not Met  3. Pt will demonstrate improved CV endurance with 6MWT score 1200 feet or more. Not met  4. Pt will ambulate 300 feet on uneven surfaces with KAFO and Rt [place] crutch mod I to dec fall risk in the community. Not met  5. Pt will score low risk fall risk on most appropriate outcome measure. Not met  Long term goal time span:  2-4 months    PLAN/RECOMMENDATIONS:   Plan for treatment: therapy treatment to continue next visit.  Planned interventions for next visit: continue with current treatment.

## 2021-12-10 ENCOUNTER — OFFICE VISIT (OUTPATIENT)
Dept: INTERNAL MEDICINE | Facility: OTHER | Age: 21
End: 2021-12-10
Payer: COMMERCIAL

## 2021-12-10 VITALS
TEMPERATURE: 97 F | WEIGHT: 159.4 LBS | DIASTOLIC BLOOD PRESSURE: 70 MMHG | OXYGEN SATURATION: 96 % | BODY MASS INDEX: 31.29 KG/M2 | HEIGHT: 60 IN | HEART RATE: 87 BPM | SYSTOLIC BLOOD PRESSURE: 114 MMHG

## 2021-12-10 DIAGNOSIS — N89.8 VAGINAL DISCHARGE: ICD-10-CM

## 2021-12-10 DIAGNOSIS — N92.6 IRREGULAR PERIODS: ICD-10-CM

## 2021-12-10 DIAGNOSIS — B37.31 YEAST INFECTION INVOLVING THE VAGINA AND SURROUNDING AREA: ICD-10-CM

## 2021-12-10 PROCEDURE — 99213 OFFICE O/P EST LOW 20 MIN: CPT | Mod: GC

## 2021-12-10 RX ORDER — FLUCONAZOLE 100 MG/1
150 TABLET ORAL DAILY
Qty: 1.5 TABLET | Refills: 0 | Status: SHIPPED | OUTPATIENT
Start: 2021-12-10 | End: 2021-12-11

## 2021-12-10 ASSESSMENT — PATIENT HEALTH QUESTIONNAIRE - PHQ9: CLINICAL INTERPRETATION OF PHQ2 SCORE: 0

## 2021-12-11 PROBLEM — B37.31 YEAST INFECTION INVOLVING THE VAGINA AND SURROUNDING AREA: Status: ACTIVE | Noted: 2021-12-11

## 2021-12-11 NOTE — PATIENT INSTRUCTIONS
1. Vaginal discharge  - antifungal, take one dose  -Go and get labs   -Call if symptoms do not resolve
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
## 2021-12-12 NOTE — ASSESSMENT & PLAN NOTE
History and physical exam indicates acute yeast infection. Because of patient's history and sexual history, STIs are also a possibility. Additionally, because patient has irregular periods, there is concern for possible pregnancy.     Plan:  1. One dose of fluconazole, to treat yeast infection  2. STI screening: gonorhhea, chlamydia, syphilis and HIV  3. Pregnancy test  4. Will follow up with test results, encouraged patient to return if symptoms worsen or do not resolve

## 2022-07-12 ENCOUNTER — TELEPHONE (OUTPATIENT)
Dept: INTERNAL MEDICINE | Facility: OTHER | Age: 22
End: 2022-07-12
Payer: COMMERCIAL

## 2022-07-12 NOTE — TELEPHONE ENCOUNTER
Caller Name: Vanessa Cobos  Call Back Number: [phone]    How would the patient prefer to be contacted with a response: Phone call OK to leave a detailed message    Medication: Patient called and wanted to know if there was anyway she can get Gabapentin filled, she used to take it in the past for her legs but does not remember the dose. I told her she might need to be seen as  has not seen her for anything related to her leg, pt wanted me to check with the provider before scheduling.

## 2022-07-13 ENCOUNTER — OFFICE VISIT (OUTPATIENT)
Dept: INTERNAL MEDICINE | Facility: OTHER | Age: 22
End: 2022-07-13
Payer: COMMERCIAL

## 2022-07-13 VITALS
HEART RATE: 74 BPM | DIASTOLIC BLOOD PRESSURE: 62 MMHG | OXYGEN SATURATION: 97 % | HEIGHT: 60 IN | TEMPERATURE: 97.8 F | WEIGHT: 152 LBS | SYSTOLIC BLOOD PRESSURE: 114 MMHG | BODY MASS INDEX: 29.84 KG/M2

## 2022-07-13 DIAGNOSIS — M79.2 NEUROPATHIC PAIN: ICD-10-CM

## 2022-07-13 PROBLEM — B37.31 YEAST INFECTION INVOLVING THE VAGINA AND SURROUNDING AREA: Status: RESOLVED | Noted: 2021-12-11 | Resolved: 2022-07-13

## 2022-07-13 PROCEDURE — 99213 OFFICE O/P EST LOW 20 MIN: CPT | Mod: GE | Performed by: STUDENT IN AN ORGANIZED HEALTH CARE EDUCATION/TRAINING PROGRAM

## 2022-07-13 RX ORDER — GABAPENTIN 100 MG/1
200 CAPSULE ORAL 3 TIMES DAILY
Qty: 90 CAPSULE | Refills: 2 | Status: SHIPPED | OUTPATIENT
Start: 2022-07-13 | End: 2023-09-20

## 2022-07-13 RX ORDER — GABAPENTIN 100 MG/1
100 CAPSULE ORAL 3 TIMES DAILY
COMMUNITY
End: 2022-07-13 | Stop reason: SDUPTHER

## 2022-07-13 ASSESSMENT — PATIENT HEALTH QUESTIONNAIRE - PHQ9: CLINICAL INTERPRETATION OF PHQ2 SCORE: 0
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
## 2022-07-13 NOTE — PATIENT INSTRUCTIONS
I placed a prescription for Gabapentin for pain.'s are typically taken 3 times a day.  Standard dosing is 2 tablets about every 6-8 hours.  You can see how you feel if you would prefer taking 1 in the morning 1 in the afternoon and 2 or 3 in the evening that is fine as well.  Please call the office to update us on your preferred dosing regimen.   If you need a higher dose please call.   Follow up in 3 months for routine pap.
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
## 2022-07-15 NOTE — TELEPHONE ENCOUNTER
Tamanna Benz M.D.  [facility] 6 minutes ago (2:29 PM)     HJ    Patient had an appointment on 7/12

## 2022-07-25 ENCOUNTER — OFFICE VISIT (OUTPATIENT)
Dept: URGENT CARE | Facility: CLINIC | Age: 22
End: 2022-07-25
Payer: COMMERCIAL

## 2022-07-25 VITALS
HEART RATE: 102 BPM | HEIGHT: 60 IN | WEIGHT: 152.6 LBS | RESPIRATION RATE: 14 BRPM | OXYGEN SATURATION: 99 % | BODY MASS INDEX: 29.96 KG/M2 | TEMPERATURE: 97.3 F | SYSTOLIC BLOOD PRESSURE: 112 MMHG | DIASTOLIC BLOOD PRESSURE: 76 MMHG

## 2022-07-25 DIAGNOSIS — T14.8XXA INFECTED WOUND: ICD-10-CM

## 2022-07-25 DIAGNOSIS — L08.9 INFECTED WOUND: ICD-10-CM

## 2022-07-25 DIAGNOSIS — L89.899 PRESSURE ULCER OF LEFT LEG: ICD-10-CM

## 2022-07-25 PROCEDURE — 99213 OFFICE O/P EST LOW 20 MIN: CPT | Performed by: PHYSICIAN ASSISTANT

## 2022-07-25 RX ORDER — DOXYCYCLINE HYCLATE 100 MG
100 TABLET ORAL 2 TIMES DAILY
Qty: 14 TABLET | Refills: 0 | Status: SHIPPED | OUTPATIENT
Start: 2022-07-25 | End: 2022-08-01

## 2022-07-25 ASSESSMENT — ENCOUNTER SYMPTOMS
FEVER: 0
VOMITING: 0
CHILLS: 0
NAUSEA: 0
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
## 2022-08-12 ENCOUNTER — NON-PROVIDER VISIT (OUTPATIENT)
Dept: WOUND CARE | Facility: MEDICAL CENTER | Age: 22
End: 2022-08-12
Attending: PHYSICIAN ASSISTANT
Payer: COMMERCIAL

## 2022-08-17 ENCOUNTER — OFFICE VISIT (OUTPATIENT)
Dept: WOUND CARE | Facility: MEDICAL CENTER | Age: 22
End: 2022-08-17
Attending: PHYSICIAN ASSISTANT
Payer: COMMERCIAL

## 2022-08-17 VITALS
OXYGEN SATURATION: 97 % | SYSTOLIC BLOOD PRESSURE: 125 MMHG | TEMPERATURE: 97.5 F | RESPIRATION RATE: 16 BRPM | DIASTOLIC BLOOD PRESSURE: 79 MMHG | HEART RATE: 86 BPM

## 2022-08-17 DIAGNOSIS — S81.802D OPEN WOUND OF LEFT LOWER LEG, SUBSEQUENT ENCOUNTER: ICD-10-CM

## 2022-08-17 DIAGNOSIS — G83.14 MONOPLEGIA OF LEFT LOWER EXTREMITY AFFECTING NONDOMINANT SIDE, UNSPECIFIED ETIOLOGY (HCC): ICD-10-CM

## 2022-08-17 PROCEDURE — 11042 DBRDMT SUBQ TIS 1ST 20SQCM/<: CPT | Performed by: NURSE PRACTITIONER

## 2022-08-17 PROCEDURE — 11042 DBRDMT SUBQ TIS 1ST 20SQCM/<: CPT

## 2022-08-17 ASSESSMENT — ENCOUNTER SYMPTOMS
CHILLS: 0
COUGH: 0
CONSTIPATION: 0
DEPRESSION: 0
SHORTNESS OF BREATH: 0
NERVOUS/ANXIOUS: 0
DIARRHEA: 0
DIZZINESS: 0
VOMITING: 0
NAUSEA: 0
FEVER: 0

## 2022-08-17 NOTE — PATIENT INSTRUCTIONS
-Keep your wound dressing clean, dry, and intact.    -Change your dressing if it becomes soiled, soaked, or falls off.    -Should you experience any significant changes in your wound(s), such as infection (redness, swelling, localized heat, increased pain, fever > 101 F, chills) or have any questions regarding your home care instructions, please contact the wound center at [phone]. If after hours, contact your primary care physician or go to the hospital emergency room.
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
## 2022-08-23 ENCOUNTER — HOSPITAL ENCOUNTER (OUTPATIENT)
Dept: RADIOLOGY | Facility: MEDICAL CENTER | Age: 22
End: 2022-08-23
Attending: NURSE PRACTITIONER
Payer: COMMERCIAL

## 2022-08-23 ENCOUNTER — TELEPHONE (OUTPATIENT)
Dept: WOUND CARE | Facility: MEDICAL CENTER | Age: 22
End: 2022-08-23
Payer: COMMERCIAL

## 2022-08-23 DIAGNOSIS — G83.14 MONOPLEGIA OF LEFT LOWER EXTREMITY AFFECTING NONDOMINANT SIDE, UNSPECIFIED ETIOLOGY (HCC): ICD-10-CM

## 2022-08-23 PROCEDURE — 73590 X-RAY EXAM OF LOWER LEG: CPT | Mod: LT

## 2022-08-23 NOTE — TELEPHONE ENCOUNTER
Telephone call from Vanessa that she completed xray but unable to get US done by apointment tomorrow. Scheduled but not until 9/17/22. Bobby REDDY aware.

## 2022-08-24 ENCOUNTER — OFFICE VISIT (OUTPATIENT)
Dept: WOUND CARE | Facility: MEDICAL CENTER | Age: 22
End: 2022-08-24
Attending: PHYSICIAN ASSISTANT
Payer: COMMERCIAL

## 2022-08-24 VITALS
OXYGEN SATURATION: 93 % | TEMPERATURE: 97.6 F | DIASTOLIC BLOOD PRESSURE: 83 MMHG | SYSTOLIC BLOOD PRESSURE: 128 MMHG | RESPIRATION RATE: 18 BRPM | HEART RATE: 101 BPM

## 2022-08-24 DIAGNOSIS — G83.14 MONOPLEGIA OF LEFT LOWER EXTREMITY AFFECTING NONDOMINANT SIDE, UNSPECIFIED ETIOLOGY (HCC): ICD-10-CM

## 2022-08-24 DIAGNOSIS — S81.802D OPEN WOUND OF LEFT LOWER LEG, SUBSEQUENT ENCOUNTER: Primary | ICD-10-CM

## 2022-08-24 PROCEDURE — 11042 DBRDMT SUBQ TIS 1ST 20SQCM/<: CPT | Performed by: NURSE PRACTITIONER

## 2022-08-24 PROCEDURE — 11042 DBRDMT SUBQ TIS 1ST 20SQCM/<: CPT

## 2022-08-24 NOTE — PATIENT INSTRUCTIONS
-Keep your wound dressing clean, dry, and intact.    -Change your dressing if it becomes soiled, soaked, or falls off.    _ Apply Hydrofera blue cut to the size of the wound and secure with hypafix tape. Change every 3-4 days, or as needed.     -Should you experience any significant changes in your wound(s), such as infection (redness, swelling, localized heat, increased pain, fever > 101 F, chills) or have any questions regarding your home care instructions, please contact the wound center at [phone]. If after hours, contact your primary care physician or go to the hospital emergency room.
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
## 2022-08-31 ENCOUNTER — NON-PROVIDER VISIT (OUTPATIENT)
Dept: WOUND CARE | Facility: MEDICAL CENTER | Age: 22
End: 2022-08-31
Attending: PHYSICIAN ASSISTANT
Payer: COMMERCIAL

## 2022-08-31 PROCEDURE — 97597 DBRDMT OPN WND 1ST 20 CM/<: CPT

## 2022-08-31 NOTE — PROCEDURES
2% topical lidocaine applied to wound bed and allowed to dwell approx 5 minutes prior to  debridement.  CSWD using curette  to remove slough and nonviable tissue from  wound bed. >5cm2   Pt tolerated weill.

## 2022-09-06 ENCOUNTER — NON-PROVIDER VISIT (OUTPATIENT)
Dept: WOUND CARE | Facility: MEDICAL CENTER | Age: 22
End: 2022-09-06
Attending: PHYSICIAN ASSISTANT
Payer: COMMERCIAL

## 2022-09-06 PROCEDURE — 97597 DBRDMT OPN WND 1ST 20 CM/<: CPT

## 2022-09-06 NOTE — PATIENT INSTRUCTIONS
-Keep your wound dressing clean, dry, and intact.    -Change your dressing if it becomes soiled, soaked, or falls off.    -Should you experience any significant changes in your wound(s), such as infection (redness, swelling, localized heat, increased pain, fever > 101 F, chills) or have any questions regarding your home care instructions, please contact the wound center at [phone]. If after hours, contact your primary care physician or go to the hospital emergency room.

## 2022-09-06 NOTE — PROCEDURES
Patient denies need for viscous lidocaine to be applied prior to debridement. CSWD with curette to debride wound bed and periwound callus of non-viable tissue. Entire surface of wound, < 20 cm2 debrided. Refer to flow sheet for wound care details. Patient tolerated the procedure well.

## 2022-09-13 ENCOUNTER — NON-PROVIDER VISIT (OUTPATIENT)
Dept: WOUND CARE | Facility: MEDICAL CENTER | Age: 22
End: 2022-09-13
Attending: PHYSICIAN ASSISTANT
Payer: COMMERCIAL

## 2022-09-21 ENCOUNTER — NON-PROVIDER VISIT (OUTPATIENT)
Dept: WOUND CARE | Facility: MEDICAL CENTER | Age: 22
End: 2022-09-21
Attending: PHYSICIAN ASSISTANT
Payer: COMMERCIAL

## 2022-09-21 PROCEDURE — 97597 DBRDMT OPN WND 1ST 20 CM/<: CPT

## 2022-09-21 NOTE — PROCEDURES
Patient denies need for viscous lidocaine to be applied prior to debridement. CSWD with scissors and forceps to remove crusted, non-viable tissue. Entire surface of wound, ~ 0.5 cm² total debrided. No bleeding noted, new epithelial tissue present beneath, wound resolved. Patient tolerated the procedure well.

## 2022-09-21 NOTE — CERTIFICATION
[facility]   [facility]   [address]   [address]   [place] [address]   [phone] Fax: [phone]    Discharge Note      Referring Physician: Jaime MEDEROS  Wound Etiology: pressure  Wound location: LLE anterior  Date of Discharge: 09/21/2022    Assessment:  Discharge patient at this time secondary to wound resolution.    Thank you for the referral and the opportunity to treat your patient.      Clinician Signature: _____________________________ Date:_______________

## 2022-09-21 NOTE — PATIENT INSTRUCTIONS
- Resolved wound be fragile for a few days, bathe and dry area gently, only ever regains a maximum of 80% of the tensile strength of the surrounding skin, remodeling of scar can continue for 6mo - a year. Contact PCP for a referral back to wound care if any problems with area opening and draining again.    -Should you experience any significant changes in your wound(s), such as infection (redness, swelling, localized heat, increased pain, fever > 101 F, chills) or have any questions regarding your home care instructions, please contact the wound center at [phone]. If after hours, contact your primary care physician or go to the hospital emergency room.

## 2022-10-13 ENCOUNTER — OFFICE VISIT (OUTPATIENT)
Dept: INTERNAL MEDICINE | Facility: OTHER | Age: 22
End: 2022-10-13
Payer: COMMERCIAL

## 2022-10-13 VITALS
OXYGEN SATURATION: 97 % | DIASTOLIC BLOOD PRESSURE: 77 MMHG | SYSTOLIC BLOOD PRESSURE: 115 MMHG | TEMPERATURE: 98.2 F | WEIGHT: 155 LBS | HEIGHT: 60 IN | HEART RATE: 77 BPM | BODY MASS INDEX: 30.43 KG/M2

## 2022-10-13 DIAGNOSIS — Z23 ENCOUNTER FOR VACCINATION: ICD-10-CM

## 2022-10-13 DIAGNOSIS — G83.10: ICD-10-CM

## 2022-10-13 DIAGNOSIS — G83.12 MONOPLEGIA OF LOWER EXTREMITY AFFECTING LEFT DOMINANT SIDE, UNSPECIFIED ETIOLOGY (HCC): ICD-10-CM

## 2022-10-13 PROCEDURE — 99213 OFFICE O/P EST LOW 20 MIN: CPT | Mod: 25,GE
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
## 2022-10-14 PROCEDURE — 90471 IMMUNIZATION ADMIN: CPT

## 2022-10-14 PROCEDURE — 90686 IIV4 VACC NO PRSV 0.5 ML IM: CPT

## 2022-10-14 NOTE — ASSESSMENT & PLAN NOTE
Has had almost four years of monoplegia of left leg. Brace has caused some wound/discomfort. Has been following with wound care. Has not been using shin strap. Has not been having neuroplegic pain as of late. Not currently taking gabapentin. She has decreased sensation, and strength in left leg. She has minor muscle movement in left hip. Unable to lift leg from this joint however. Decreased sensation from mid-thigh to feet. Unable to move toes, ankle or knee joint. Discussed seeing PMR to see if anything could be done about her brace or improvements in this front. She states she is happy with current brace and will delay seeing them at this time.     Plan:  -Physical therapy referral

## 2022-12-09 ENCOUNTER — HOSPITAL ENCOUNTER (EMERGENCY)
Facility: MEDICAL CENTER | Age: 22
End: 2022-12-09
Attending: EMERGENCY MEDICINE
Payer: COMMERCIAL

## 2022-12-09 VITALS
OXYGEN SATURATION: 98 % | HEIGHT: 60 IN | RESPIRATION RATE: 16 BRPM | TEMPERATURE: 96.8 F | SYSTOLIC BLOOD PRESSURE: 122 MMHG | BODY MASS INDEX: 29.95 KG/M2 | HEART RATE: 86 BPM | DIASTOLIC BLOOD PRESSURE: 77 MMHG | WEIGHT: 152.56 LBS
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
## 2022-12-09 PROCEDURE — 99284 EMERGENCY DEPT VISIT MOD MDM: CPT
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
## 2023-02-16 ENCOUNTER — APPOINTMENT (OUTPATIENT)
Dept: INTERNAL MEDICINE | Facility: OTHER | Age: 23
End: 2023-02-16
Payer: COMMERCIAL

## 2023-09-19 ENCOUNTER — APPOINTMENT (OUTPATIENT)
Dept: INTERNAL MEDICINE | Facility: OTHER | Age: 23
End: 2023-09-19
Payer: COMMERCIAL

## 2023-09-20 ENCOUNTER — OFFICE VISIT (OUTPATIENT)
Dept: INTERNAL MEDICINE | Facility: OTHER | Age: 23
End: 2023-09-20
Payer: COMMERCIAL

## 2023-09-20 VITALS
OXYGEN SATURATION: 95 % | DIASTOLIC BLOOD PRESSURE: 86 MMHG | SYSTOLIC BLOOD PRESSURE: 131 MMHG | TEMPERATURE: 97 F | BODY MASS INDEX: 32.03 KG/M2 | HEART RATE: 98 BPM | WEIGHT: 164 LBS

## 2023-09-20 DIAGNOSIS — Z11.3 SCREENING FOR STD (SEXUALLY TRANSMITTED DISEASE): ICD-10-CM

## 2023-09-20 DIAGNOSIS — M23.51 CHRONIC INSTABILITY OF KNEE, RIGHT KNEE: ICD-10-CM

## 2023-09-20 DIAGNOSIS — Z23 ENCOUNTER FOR VACCINATION: ICD-10-CM

## 2023-09-20 DIAGNOSIS — Z11.59 ENCOUNTER FOR HEPATITIS C SCREENING TEST FOR LOW RISK PATIENT: ICD-10-CM

## 2023-09-20 DIAGNOSIS — G83.10: ICD-10-CM
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
## 2023-09-20 DIAGNOSIS — Z11.3 ROUTINE SCREENING FOR STI (SEXUALLY TRANSMITTED INFECTION): ICD-10-CM

## 2023-09-20 DIAGNOSIS — M70.62 GREATER TROCHANTERIC BURSITIS OF LEFT HIP: ICD-10-CM

## 2023-09-20 PROCEDURE — 90471 IMMUNIZATION ADMIN: CPT

## 2023-09-20 PROCEDURE — 3079F DIAST BP 80-89 MM HG: CPT

## 2023-09-20 PROCEDURE — 99214 OFFICE O/P EST MOD 30 MIN: CPT | Mod: 25,GC

## 2023-09-20 PROCEDURE — 90472 IMMUNIZATION ADMIN EACH ADD: CPT

## 2023-09-20 PROCEDURE — 90715 TDAP VACCINE 7 YRS/> IM: CPT

## 2023-09-20 PROCEDURE — 90686 IIV4 VACC NO PRSV 0.5 ML IM: CPT

## 2023-09-20 PROCEDURE — 3075F SYST BP GE 130 - 139MM HG: CPT

## 2023-09-20 ASSESSMENT — ENCOUNTER SYMPTOMS
DIZZINESS: 1
NAUSEA: 0
CHILLS: 0
HEADACHES: 0
ABDOMINAL PAIN: 0
FEVER: 0
VOMITING: 0
EYES NEGATIVE: 1

## 2023-09-20 ASSESSMENT — PATIENT HEALTH QUESTIONNAIRE - PHQ9: CLINICAL INTERPRETATION OF PHQ2 SCORE: 0
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
## 2023-09-22 PROBLEM — Z23 ENCOUNTER FOR VACCINATION: Status: ACTIVE | Noted: 2023-09-22

## 2023-09-22 PROBLEM — Z11.3 ROUTINE SCREENING FOR STI (SEXUALLY TRANSMITTED INFECTION): Status: ACTIVE | Noted: 2023-09-22

## 2023-09-22 NOTE — ASSESSMENT & PLAN NOTE
Patient has had one partner prior, not currently sexually active. Would like to be screened.     Plan:  -Gonorrhea and Chlamydia  -HIV  -Syphilis

## 2023-09-22 NOTE — ASSESSMENT & PLAN NOTE
Patient has decreased sensation from mid thigh through foot. Has felt that her the femur area has been coming lose (had amaury placement).  No pain present.     Plan:  -DMV paperwork  -X-ray of femur  -May consider orthopedics referral depending on imaging

## 2023-11-09 ENCOUNTER — PHYSICAL THERAPY (OUTPATIENT)
Dept: PHYSICAL THERAPY | Facility: REHABILITATION | Age: 23
End: 2023-11-09
Payer: COMMERCIAL
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
## 2023-11-09 DIAGNOSIS — R53.1 WEAKNESS: ICD-10-CM

## 2023-11-09 PROCEDURE — 97162 PT EVAL MOD COMPLEX 30 MIN: CPT

## 2023-11-09 SDOH — ECONOMIC STABILITY: GENERAL: QUALITY OF LIFE: GOOD

## 2023-11-09 ASSESSMENT — ENCOUNTER SYMPTOMS
PAIN SCALE: 0
PAIN SCALE AT HIGHEST: 0
PAIN SCALE AT LOWEST: 0
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
## 2023-11-22 ENCOUNTER — APPOINTMENT (OUTPATIENT)
Dept: PHYSICAL THERAPY | Facility: REHABILITATION | Age: 23
End: 2023-11-22
Payer: COMMERCIAL

## 2023-11-30 NOTE — PROGRESS NOTES
Rehab Progress Note     Encounter Date: 2/14/2019    CC: left leg weakness    Interval Events (Subjective)  She complains of increasing pain over the left lateral hip, describes as 8 out of 10, aching, worse with movement, improved with rest, makes sleeping difficult, no radiation, constant, no other associated symptoms, pain recently started over the past couple of days    She had good bowel movement last night with suppository, she was able to have bowel movements prior without suppository requesting that she does not have suppository scheduled.    ROS:  Gen: No fatigue, confusion, significant weight loss  Eyes: no blurry vision, double vision or pain in eyes  ENT: no changes in hearing, runny nose, nose bleeds, sinus pain  CV: No CP, palpitations,  Lungs: no shortness of breath, pain with coughing  Abd: No abd pain, nausea, vomiting, pain with eating  : no blood in urine, suprapubic pain  Ext: No swelling in legs, asymmetric atrophy  Neuro: no changes in strength or sensation  Skin: no new ulcers/skin breakdown appreciated by patient  Mood: No changes in mood today, increase in depression or anxiety  Heme: no bruising, or bleeding      Objective:  Vitals: Blood pressure (!) 94/60, pulse 75, temperature 37.1 °C (98.8 °F), temperature source Oral, resp. rate 17, height 1.524 m (5'), weight 65.5 kg (144 lb 6.4 oz), last menstrual period 02/12/2019, SpO2 96 %, not currently breastfeeding.  Gen: NAD, Body mass index is 28.2 kg/m².   HEENT: NC/AT, PERRLA, moist mucous membranes  Cardio: RRR, no mumurs  Pulm: CTAB, with normal respiratory effort  Abd: Soft NTND, active bowel sounds,   Ext: No peripheral edema. No calf tenderness. No clubbing/cyanosis. +dorsal pedalis pulses bilaterally.  Tender to palpation over the left greater trochanter, positive fingers      Recent Results (from the past 72 hour(s))   CBC with Differential    Collection Time: 02/14/19  6:07 AM   Result Value Ref Range    WBC 5.0 4.8 - 10.8 K/uL     Urology at bedside for evaluation.       Mitesh Edge  11/30/23 4693 RBC 4.10 (L) 4.20 - 5.40 M/uL    Hemoglobin 12.3 12.0 - 16.0 g/dL    Hematocrit 37.6 37.0 - 47.0 %    MCV 91.7 81.4 - 97.8 fL    MCH 30.0 27.0 - 33.0 pg    MCHC 32.7 (L) 33.6 - 35.0 g/dL    RDW 44.4 35.9 - 50.0 fL    Platelet Count 441 164 - 446 K/uL    MPV 9.3 9.0 - 12.9 fL    Neutrophils-Polys 53.00 44.00 - 72.00 %    Lymphocytes 35.70 22.00 - 41.00 %    Monocytes 7.10 0.00 - 13.40 %    Eosinophils 2.40 0.00 - 6.90 %    Basophils 0.40 0.00 - 1.80 %    Immature Granulocytes 1.40 (H) 0.00 - 0.90 %    Nucleated RBC 0.00 /100 WBC    Neutrophils (Absolute) 2.63 2.00 - 7.15 K/uL    Lymphs (Absolute) 1.77 1.00 - 4.80 K/uL    Monos (Absolute) 0.35 0.00 - 0.85 K/uL    Eos (Absolute) 0.12 0.00 - 0.51 K/uL    Baso (Absolute) 0.02 0.00 - 0.12 K/uL    Immature Granulocytes (abs) 0.07 0.00 - 0.11 K/uL    NRBC (Absolute) 0.00 K/uL   Comp Metabolic Panel (CMP)    Collection Time: 02/14/19  6:07 AM   Result Value Ref Range    Sodium 137 135 - 145 mmol/L    Potassium 4.0 3.6 - 5.5 mmol/L    Chloride 106 96 - 112 mmol/L    Co2 25 20 - 33 mmol/L    Anion Gap 6.0 0.0 - 11.9    Glucose 89 65 - 99 mg/dL    Bun 13 8 - 22 mg/dL    Creatinine 0.46 (L) 0.50 - 1.40 mg/dL    Calcium 10.0 8.5 - 10.5 mg/dL    AST(SGOT) 18 12 - 45 U/L    ALT(SGPT) 29 2 - 50 U/L    Alkaline Phosphatase 114 45 - 125 U/L    Total Bilirubin 0.3 0.1 - 1.2 mg/dL    Albumin 3.8 3.2 - 4.9 g/dL    Total Protein 7.0 6.0 - 8.2 g/dL    Globulin 3.2 1.9 - 3.5 g/dL    A-G Ratio 1.2 g/dL   Prealbumin    Collection Time: 02/14/19  6:07 AM   Result Value Ref Range    Pre-Albumin 22.0 18.0 - 38.0 mg/dL   Prothrombin time    Collection Time: 02/14/19  6:07 AM   Result Value Ref Range    PT 13.4 12.0 - 14.6 sec    INR 1.01 0.87 - 1.13   TSH with Reflex to FT4    Collection Time: 02/14/19  6:07 AM   Result Value Ref Range    TSH 2.510 0.380 - 5.330 uIU/mL   Vitamin D, 25-hydroxy (blood)    Collection Time: 02/14/19  6:07 AM   Result Value Ref Range    25-Hydroxy   Vitamin D 25 12  (L) 30 - 100 ng/mL   ESTIMATED GFR    Collection Time: 02/14/19  6:07 AM   Result Value Ref Range    GFR If African American >60 >60 mL/min/1.73 m 2    GFR If Non African American >60 >60 mL/min/1.73 m 2       Current Facility-Administered Medications   Medication Frequency   • gabapentin (NEURONTIN) capsule 600 mg TID   • DULoxetine (CYMBALTA) capsule 30 mg DAILY   • hydrOXYzine HCl (ATARAX) tablet 100 mg QHS   • polyethylene glycol/lytes (MIRALAX) PACKET 1 Packet DAILY   • senna-docusate (PERICOLACE or SENOKOT S) 8.6-50 MG per tablet 2 Tab DAILY AT NOON   • lidocaine (XYLOCAINE) 2 % jelly PRN    And   • nitroglycerin (NITRO-BID) 2 % ointment 1 Inch PRN   • Pharmacy Consult Request ...Pain Management Review 1 Each PHARMACY TO DOSE   • tramadol (ULTRAM) 50 MG tablet 50 mg Q4HRS PRN   • acetaminophen (TYLENOL) tablet 650 mg Q4HRS PRN   • enoxaparin (LOVENOX) inj 40 mg QHS   • ascorbic acid tablet 500 mg BID WITH MEALS   • multivitamin (THERAGRAN) tablet 1 Tab DAILY WITH LUNCH   • acetaminophen (TYLENOL) tablet 975 mg Q6HRS   • omeprazole (PRILOSEC) capsule 20 mg DAILY       Orders Placed This Encounter   Procedures   • Diet Order Regular     Standing Status:   Standing     Number of Occurrences:   1     Order Specific Question:   Diet:     Answer:   Regular [1]     Order Specific Question:   Consistency/Fluid modifications:     Answer:   Thin Liquids [3]         Medical Decision Making and Plan:  TBI:   -Discussed with speech therapy today mild attention and memory deficits, unclear extent of baseline given 18-year-old dropped out of school at grade 9.  -We will have speech therapy continue working with patient on IADLs and compensation mechanisms.    Left L3, L4 and possible L5 nerve root avulsion: Left lower extremity flaccid, 0/4 patella and Achilles tendon  -Increased neuropathic pain as below  - Weightbearing as tolerated  -Patient may benefit from MR neurogram for confirmation of nerve root avulsion of the left  lumbar plexus, will discuss with patient possibility of nerve graft in the future    Neurogenic bowel:  Patient is having good bowel movements requesting to trial without suppository or digital stimulation.  -will DC suppository    Neuropathic pain: Tingling/burning down lower extremity  -Increase gabapentin to 600 mg 3 times a day    Greater trochanteric bursitis: Left posterior facet pain reproducible with palpation over the greater trochanter  -Start ice to left lateral hip  - Start lidocaine patch over this area on for 12 hours off for 12 hours  -Discussed with her option of possible steroid injection in the future  -We will discuss with physical therapy on stretching mechanisms and exercises      Total time:  35 minutes.  I spent greater than 50% of the time for patient care and coordination on this date, including unit/floor time, and face-to-face time with the patient as per assessment and plan above.    Jaime Cabrera D.O.

## 2023-12-05 ENCOUNTER — PHYSICAL THERAPY (OUTPATIENT)
Dept: PHYSICAL THERAPY | Facility: REHABILITATION | Age: 23
End: 2023-12-05
Payer: COMMERCIAL

## 2023-12-05 DIAGNOSIS — R53.1 WEAKNESS: ICD-10-CM
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
## 2023-12-05 PROCEDURE — 97110 THERAPEUTIC EXERCISES: CPT

## 2023-12-05 NOTE — OP THERAPY DAILY TREATMENT
Outpatient Physical Therapy  DAILY TREATMENT     [facility] Physical Therapy [address].  [address]  [place] [address]  Phone:  [phone]  Fax:  [phone]    Date: 12/05/2023    Patient: Vanessa Matson  YOB: 2000  MRN: [id number]     Time Calculation    Start time: 1415  Stop time: 1456 Time Calculation (min): 41 minutes         Chief Complaint: Weakness    Visit #: 2    SUBJECTIVE:  Pt has been compliant with HEP. Wonders if sensation in LE will get better    OBJECTIVE:  Current objective measures:           Therapeutic Exercises (CPT 16520):     1. prone glute squeeze, 5 x 30    2. sidelying hip ext knee bent with manual resistance and mod A into full ext, Lt LE 3 x 5    3. Sidelying clamshell, 5 x 6 Lt    4. hooklying clamshell iso OTB, 2 x10 x 10 holds bilat, inc difficulty exx/iso during Rt LE contraction with vc to maintain neutral    5. bridge, 2 x 10, min a, miproved with reps for maintaining left LE positioning    8. education on nerve regerntion/rate and dependent on time and type of injury      Therapeutic Exercise Summary: Auth # [id number]  Dates: 11/22/2023-1/5/2024  Visits: 6  CPT: 93007, 37550 estim x 1      Exercises  - Prone Hip Extension  - 1 x daily - 7 x weekly - 2 sets - 10 reps - 10 hold  - Prone Gluteal Sets  - 1 x daily - 7 x weekly - 2 sets - 10 reps - 10 hold  Access Code: [email or web address]  URL: [email or web address]  Date: 12/05/2023  Prepared by:     Exercises  - Clamshell  - 1 x daily - 7 x weekly - 5 sets - 5 reps  - Hooklying Isometric Clamshell  - 1 x daily - 7 x weekly - 2 sets - 10 reps - 10 hold  - Supine Bridge  - 1 x daily - 7 x weekly - 3 sets - 10 reps      Time-based treatments/modalities:    Physical Therapy Timed Treatment Charges  Therapeutic exercise minutes (CPT 30666): 41 minutes        ASSESSMENT:   Response to treatment: Demod improved glute muscle contraction in prone.Demod improved hip abduction compared to evaluation  as well with some contraction against gravity justin shortened. Knee flexed position. Progressed HEP    PLAN/RECOMMENDATIONS:   Plan for treatment: therapy treatment to continue next visit.  Planned interventions for next visit: continue with current treatment.

## 2023-12-07 ENCOUNTER — APPOINTMENT (OUTPATIENT)
Dept: PHYSICAL THERAPY | Facility: REHABILITATION | Age: 23
End: 2023-12-07
Payer: COMMERCIAL

## 2023-12-11 ENCOUNTER — PHYSICAL THERAPY (OUTPATIENT)
Dept: PHYSICAL THERAPY | Facility: REHABILITATION | Age: 23
End: 2023-12-11
Payer: COMMERCIAL

## 2023-12-11 DIAGNOSIS — R53.1 WEAKNESS: ICD-10-CM
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
## 2023-12-11 PROCEDURE — 97110 THERAPEUTIC EXERCISES: CPT

## 2023-12-11 NOTE — OP THERAPY DAILY TREATMENT
Outpatient Physical Therapy  DAILY TREATMENT     [facility] Physical [address].  [address]  [place] [address]  Phone:  [phone]  Fax:  [phone]    Date: 12/11/2023    Patient: Vanessa Matson  YOB: 2000  MRN: [id number]     Time Calculation    Start time: 1415  Stop time: 1458 Time Calculation (min): 43 minutes         Chief Complaint: Weakness    Visit #: 3    SUBJECTIVE:  Pt has been compliant with HEP. Wonders if sensation in LE will get better    OBJECTIVE:  Current objective measures:           Therapeutic Exercises (CPT 33179):     1. prone hip ext iso, 2 x 10 x 5 sec, 1+ via mmt    2. sidelying hip ext knee bent with manual resistance and mod A into full ext, Lt LE 3 x 6 5 sec concentric holds    3. supine hip abduction, 3 x 10, cues keep toes pointed to ceiling    4. hooklying clamshell iso OTB, 2 x10 x 10 holds bilat, inc difficulty exx/iso during Rt LE contraction with vc to maintain neutral    5. bridge, 6 x 30 sec, min a, miproved with reps for maintaining left LE positioning    6. sidelying hip ext iso into wall HEP, pillow t maintain neutral/abduction, 10 x 5 sec    7. Ecc bridge up 2, down 1, x 5 attempts unsuccessful      Therapeutic Exercise Summary: Auth # [id number]  Dates: 11/22/2023-1/5/2024  Visits: 6  CPT: 25557, 96370 estim x 1      Exercises  - Prone Hip Extension  - 1 x daily - 7 x weekly - 2 sets - 10 reps - 10 hold  - Prone Gluteal Sets  - 1 x daily - 7 x weekly - 2 sets - 10 reps - 10 hold  Access Code: [email or web address]  URL: [email or web address]  Date: 12/05/2023  Prepared by:     Exercises  - Clamshell  - 1 x daily - 7 x weekly - 5 sets - 5 reps  - Hooklying Isometric Clamshell  - 1 x daily - 7 x weekly  - 10 reps - 30 hold  - Supine Bridge  - 1 x daily - 7 x weekly - 3 sets - 10 reps  Access Code: [email or web address]  URL: [email or web address]  Date: 12/11/2023  Prepared by:     Exercises  - Sidelying Hip Extension in Abduction  with Knee Bent  - 1 x daily - 7 x weekly - 3 sets - 5 reps - 5-10 hold  - Supine Hip Abduction AROM  - 1 x daily - 7 x weekly - 3 sets - 10 reps      Time-based treatments/modalities:    Physical Therapy Timed Treatment Charges  Therapeutic exercise minutes (CPT 30960): 43 minutes        ASSESSMENT:   Response to treatment: Continued to progress HEP with strength training. Reiterated although she is active walking is not enough to make significant strength   PLAN/RECOMMENDATIONS: tall kneeling  Plan for treatment: therapy treatment to continue next visit.  Planned interventions for next visit: continue with current treatment.

## 2023-12-12 ENCOUNTER — OFFICE VISIT (OUTPATIENT)
Dept: INTERNAL MEDICINE | Facility: OTHER | Age: 23
End: 2023-12-12
Payer: COMMERCIAL

## 2023-12-12 VITALS
HEIGHT: 60 IN | BODY MASS INDEX: 32.79 KG/M2 | TEMPERATURE: 97.2 F | HEART RATE: 69 BPM | DIASTOLIC BLOOD PRESSURE: 79 MMHG | OXYGEN SATURATION: 97 % | SYSTOLIC BLOOD PRESSURE: 114 MMHG | WEIGHT: 167 LBS

## 2023-12-12 DIAGNOSIS — G83.10: ICD-10-CM

## 2023-12-12 DIAGNOSIS — R25.2 MUSCLE CRAMPING: ICD-10-CM

## 2023-12-12 DIAGNOSIS — S34.21XD: ICD-10-CM

## 2023-12-12 PROCEDURE — 99214 OFFICE O/P EST MOD 30 MIN: CPT | Mod: GC

## 2023-12-12 PROCEDURE — 3074F SYST BP LT 130 MM HG: CPT

## 2023-12-12 PROCEDURE — 3078F DIAST BP <80 MM HG: CPT

## 2023-12-12 RX ORDER — BACLOFEN 10 MG/1
5 TABLET ORAL NIGHTLY PRN
Qty: 15 TABLET | Refills: 3 | Status: SHIPPED | OUTPATIENT
Start: 2023-12-12

## 2023-12-12 NOTE — PATIENT INSTRUCTIONS
Referrals for orthotics/prosthetics  Continue with PT exercises  Get labs and X-ray for next visit  Take 1/2 of baclofen at night if having cramping of legs, you can take up to 5 mg three times per day, inform us if you are needing increased timing of doses
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
## 2023-12-14 ENCOUNTER — APPOINTMENT (OUTPATIENT)
Dept: PHYSICAL THERAPY | Facility: REHABILITATION | Age: 23
End: 2023-12-14
Payer: COMMERCIAL

## 2023-12-14 PROBLEM — Z11.3 ROUTINE SCREENING FOR STI (SEXUALLY TRANSMITTED INFECTION): Status: RESOLVED | Noted: 2023-09-22 | Resolved: 2023-12-14

## 2023-12-15 NOTE — ASSESSMENT & PLAN NOTE
Improved with PT, decreased strength from thigh downwards. Pain and discomfort from prosthetic brace. Indentation in thigh area also having muscle spasms in that area. Concern that this is limiting her mobility.     Plan:  -X-ray of hip joint  -Continue PT exercises  -Orthotics referral  -Baclofen for muscle cramping

## 2023-12-21 ENCOUNTER — APPOINTMENT (OUTPATIENT)
Dept: PHYSICAL THERAPY | Facility: REHABILITATION | Age: 23
End: 2023-12-21
Payer: COMMERCIAL

## 2023-12-26 ENCOUNTER — APPOINTMENT (OUTPATIENT)
Dept: PHYSICAL THERAPY | Facility: REHABILITATION | Age: 23
End: 2023-12-26
Payer: COMMERCIAL

## 2023-12-26 NOTE — OP THERAPY DAILY TREATMENT
Outpatient Physical Therapy  DAILY TREATMENT     [facility] [address].  [address]  [place] [address]  Phone:  [phone]  Fax:  [phone]    Date: 12/26/2023    Patient: Vanessa Matson  YOB: 2000  MRN: [id number]     Time Calculation                   Chief Complaint: No chief complaint on file.    Visit #: 4    SUBJECTIVE:  Pt has been compliant with HEP. Wonders if sensation in LE will get better    OBJECTIVE:  Current objective measures:           Therapeutic Exercises (CPT 05675):     1. prone hip ext iso, 2 x 10 x 5 sec, 1+ via mmt    2. sidelying hip ext knee bent with manual resistance and mod A into full ext, Lt LE 3 x 6 5 sec concentric holds    3. supine hip abduction, 3 x 10, cues keep toes pointed to ceiling    4. hooklying clamshell iso OTB, 2 x10 x 10 holds bilat, inc difficulty exx/iso during Rt LE contraction with vc to maintain neutral    5. bridge, 6 x 30 sec, min a, miproved with reps for maintaining left LE positioning    6. sidelying hip ext iso into wall HEP, pillow t maintain neutral/abduction, 10 x 5 sec    7. Ecc bridge up 2, down 1, x 5 attempts unsuccessful      Therapeutic Exercise Summary: Auth # [id number]  Dates: 11/22/2023-1/5/2024  Visits: 6  CPT: 71934, 35017 estim x 1      Exercises  - Prone Hip Extension  - 1 x daily - 7 x weekly - 2 sets - 10 reps - 10 hold  - Prone Gluteal Sets  - 1 x daily - 7 x weekly - 2 sets - 10 reps - 10 hold  Access Code: [email or web address]  URL: [email or web address]  Date: 12/05/2023  Prepared by:     Exercises  - Clamshell  - 1 x daily - 7 x weekly - 5 sets - 5 reps  - Hooklying Isometric Clamshell  - 1 x daily - 7 x weekly  - 10 reps - 30 hold  - Supine Bridge  - 1 x daily - 7 x weekly - 3 sets - 10 reps  Access Code: [email or web address]  URL: [email or web address]  Date: 12/11/2023  Prepared by:     Exercises  - Sidelying Hip Extension in Abduction with Knee Bent  - 1 x daily - 7 x weekly -  3 sets - 5 reps - 5-10 hold  - Supine Hip Abduction AROM  - 1 x daily - 7 x weekly - 3 sets - 10 reps      Time-based treatments/modalities:             ASSESSMENT:   Response to treatment:  PLAN/RECOMMENDATIONS: tall kneeling  Plan for treatment: therapy treatment to continue next visit.  Planned interventions for next visit: continue with current treatment.

## 2023-12-27 ENCOUNTER — TELEPHONE (OUTPATIENT)
Dept: PHYSICAL THERAPY | Facility: REHABILITATION | Age: 23
End: 2023-12-27
Payer: COMMERCIAL

## 2023-12-27 ENCOUNTER — APPOINTMENT (OUTPATIENT)
Dept: PHYSICAL THERAPY | Facility: REHABILITATION | Age: 23
End: 2023-12-27
Payer: COMMERCIAL

## 2023-12-27 NOTE — OP THERAPY DAILY TREATMENT
Outpatient Physical Therapy  DAILY TREATMENT     [facility] [address].  [address]  [place] [address]  Phone:  [phone]  Fax:  [phone]    Date: 12/27/2023    Patient: Vanessa Matson  YOB: 2000  MRN: [id number]     Time Calculation                   Chief Complaint: No chief complaint on file.    Visit #: 4    SUBJECTIVE:  Pt has been compliant with HEP. Wonders if sensation in LE will get better    OBJECTIVE:  Current objective measures:           Therapeutic Exercises (CPT 04947):     1. prone hip ext iso, 2 x 10 x 5 sec, 1+ via mmt    2. sidelying hip ext knee bent with manual resistance and mod A into full ext, Lt LE 3 x 6 5 sec concentric holds    3. supine hip abduction, 3 x 10, cues keep toes pointed to ceiling    4. hooklying clamshell iso OTB, 2 x10 x 10 holds bilat, inc difficulty exx/iso during Rt LE contraction with vc to maintain neutral    5. bridge, 6 x 30 sec, min a, miproved with reps for maintaining left LE positioning    6. sidelying hip ext iso into wall HEP, pillow t maintain neutral/abduction, 10 x 5 sec    7. Ecc bridge up 2, down 1, x 5 attempts unsuccessful      Therapeutic Exercise Summary: Auth # [id number]  Dates: 11/22/2023-1/5/2024  Visits: 6  CPT: 83438, 28784 estim x 1      Exercises  - Prone Hip Extension  - 1 x daily - 7 x weekly - 2 sets - 10 reps - 10 hold  - Prone Gluteal Sets  - 1 x daily - 7 x weekly - 2 sets - 10 reps - 10 hold  Access Code: [email or web address]  URL: [email or web address]  Date: 12/05/2023  Prepared by:     Exercises  - Clamshell  - 1 x daily - 7 x weekly - 5 sets - 5 reps  - Hooklying Isometric Clamshell  - 1 x daily - 7 x weekly  - 10 reps - 30 hold  - Supine Bridge  - 1 x daily - 7 x weekly - 3 sets - 10 reps  Access Code: [email or web address]  URL: [email or web address]  Date: 12/11/2023  Prepared by:     Exercises  - Sidelying Hip Extension in Abduction with Knee Bent  - 1 x daily - 7 x weekly -  3 sets - 5 reps - 5-10 hold  - Supine Hip Abduction AROM  - 1 x daily - 7 x weekly - 3 sets - 10 reps      Time-based treatments/modalities:             ASSESSMENT:   Response to treatment:  PLAN/RECOMMENDATIONS: tall kneeling  Plan for treatment: therapy treatment to continue next visit.  Planned interventions for next visit: continue with current treatment.
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
## 2023-12-28 ENCOUNTER — APPOINTMENT (OUTPATIENT)
Dept: PHYSICAL THERAPY | Facility: REHABILITATION | Age: 23
End: 2023-12-28
Payer: COMMERCIAL

## 2024-01-19 ENCOUNTER — APPOINTMENT (OUTPATIENT)
Dept: RADIOLOGY | Facility: MEDICAL CENTER | Age: 24
End: 2024-01-19
Payer: COMMERCIAL

## 2024-04-17 ENCOUNTER — OFFICE VISIT (OUTPATIENT)
Dept: INTERNAL MEDICINE | Facility: OTHER | Age: 24
End: 2024-04-17
Payer: COMMERCIAL

## 2024-04-17 ENCOUNTER — TELEPHONE (OUTPATIENT)
Dept: INTERNAL MEDICINE | Facility: OTHER | Age: 24
End: 2024-04-17

## 2024-04-17 VITALS
DIASTOLIC BLOOD PRESSURE: 78 MMHG | BODY MASS INDEX: 33.65 KG/M2 | HEART RATE: 82 BPM | TEMPERATURE: 97.2 F | SYSTOLIC BLOOD PRESSURE: 117 MMHG | OXYGEN SATURATION: 96 % | WEIGHT: 171.4 LBS | HEIGHT: 60 IN

## 2024-04-17 DIAGNOSIS — N92.6 IRREGULAR PERIODS/MENSTRUAL CYCLES: ICD-10-CM

## 2024-04-17 DIAGNOSIS — N92.6 IRREGULAR PERIODS: ICD-10-CM

## 2024-04-17 LAB
POCT INT CON NEG: NEGATIVE
POCT INT CON POS: POSITIVE
POCT URINE PREGNANCY TEST: NEGATIVE

## 2024-04-17 ASSESSMENT — PAIN SCALES - GENERAL: PAINLEVEL: NO PAIN

## 2024-04-17 ASSESSMENT — PATIENT HEALTH QUESTIONNAIRE - PHQ9: CLINICAL INTERPRETATION OF PHQ2 SCORE: 0
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
## 2024-04-17 NOTE — TELEPHONE ENCOUNTER
Patient stated that I may call with POC pregnancy results. Informed patient that the test was negative.

## 2024-04-18 PROBLEM — N92.6 IRREGULAR PERIODS: Status: ACTIVE | Noted: 2024-04-18

## 2024-04-18 NOTE — ASSESSMENT & PLAN NOTE
Has history of irregular periods.  Patient's last menses was in November.  Was sexually active at that time however point-of-care test was negative.  Patient has history of irregular periods along with some increased facial hair over the last 2 years.    Plan:  - Labs for next visit eluding TSH, LH, testosterone, prolactin, 17 hydroxypregnenolone, hemoglobin A1c, FSH
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
